# Patient Record
Sex: FEMALE | Race: WHITE | NOT HISPANIC OR LATINO | Employment: FULL TIME | URBAN - METROPOLITAN AREA
[De-identification: names, ages, dates, MRNs, and addresses within clinical notes are randomized per-mention and may not be internally consistent; named-entity substitution may affect disease eponyms.]

---

## 2017-05-22 ENCOUNTER — ALLSCRIPTS OFFICE VISIT (OUTPATIENT)
Dept: OTHER | Facility: OTHER | Age: 47
End: 2017-05-22

## 2017-10-31 ENCOUNTER — ALLSCRIPTS OFFICE VISIT (OUTPATIENT)
Dept: OTHER | Facility: OTHER | Age: 47
End: 2017-10-31

## 2017-10-31 DIAGNOSIS — N92.6 IRREGULAR MENSTRUATION: ICD-10-CM

## 2017-10-31 DIAGNOSIS — Z12.31 ENCOUNTER FOR SCREENING MAMMOGRAM FOR MALIGNANT NEOPLASM OF BREAST: ICD-10-CM

## 2017-11-04 LAB
HPV 18 (HISTORICAL): NOT DETECTED
HPV HIGH RISK 16/18 (HISTORICAL): NOT DETECTED
HPV16 (HISTORICAL): NOT DETECTED
PAP (HISTORICAL): NORMAL

## 2017-11-04 NOTE — PROGRESS NOTES
Assessment  1  Encounter for gynecological examination without abnormal finding (V72 31) (Z01 419)    Discussion/Summary  health maintenance visit healthy adult female Currently, she eats a healthy diet  the risks and benefits of cervical cancer screening were discussed cervical cancer screening is current Pap test was done today HPV and Pap Co-testing Done Today Breast cancer screening: the risks and benefits of breast cancer screening were discussed, self breast exam technique was taught, monthly self breast exam was advised and mammogram has been ordered  Colorectal cancer screening: colorectal cancer screening is not indicated  Osteoporosis screening: bone mineral density testing is not indicated  Advice and education were given regarding nutrition, calcium supplements and vitamin D supplements  Patient discussion: discussed with the patient  Secondary to sudden change in menses will check TFTs and CBC to further evaluate  Reassured patient in perimenopausal times menstrual cycles can start to be irregular before they go away  to track cycles  Return to office for annual as as needed  The patient has the current Goals: Continue to track cycles have blood work done to further evaluate  Reassured likely perimenopause  The patent has the current Barriers: Irregular menstrual cycles  Patient is able to Self-Care  The treatment plan was reviewed with the patient/guardian  The patient/guardian understands and agrees with the treatment plan      History of Present Illness  HPI: 50 yo seen for annual exam  Patient reports over the past 6 months menstrual cycles have been more irregular sometimes occurring 2 weeks early occasionally 2 weeks late  Reports typically has 1 heavy day and then the rest the 5 7 days is tolerable  Last pap: 2016 NILM, 2/2015 NILM (-)HRHPV  Last mammogram 3/3/16 [facility] 1 Negative  Denies bowel or bladder issues  GYN HM, Adult Female [facility]:  The patient is being seen for a health maintenance and gynecology evaluation  Social History: She is   Work status: occupation: [employer]  The patient has never smoked cigarettes  She reports never drinking alcohol  She has never used illicit drugs  General Health: The patient's health since the last visit is described as good  Immunizations status: not up to date  Lifestyle:  She does not use tobacco -- She denies alcohol use  Reproductive health: the patient is premenopausal--   she reports menstrual problems  Menstrual history:  age at menarche was 14  LMP: the last menstrual period was 2/26/2016  Recent menstrual periods: bleeding has been normal  The cycles are irregular-- and-- occur approximately every 14-40 days  The duration of her recent periods has been regular-- and-- usually last 4-7 days  -- she is sexually active  She is monogamous with a male partner-- and-- declines STD testing, denies abuse  Screening:      Review of Systems  menorrhagia, but-- no pelvic pain,-- no pelvic pressure,-- no vaginal pain,-- no vaginal discharge,-- no vaginal itching,-- no vaginal lump or mass,-- no vaginal odor,-- no vulvar pain,-- no vulvar itching,-- no vulvar lump or mass,-- no labial swelling,-- no dysmenorrhea,-- denied amenorrhea,-- no hypomenorrhea,-- no oligomenorrhea,-- no decreased time between periods,-- periods are regular,-- regular length of periods,-- no dysuria,-- no bladder pain,-- no hematuria,-- no burning sensation during urination,-- no change in urinary frequency,-- no feelings of urinary urgency,-- no flank pain,-- no abnormal urethral discharge,-- urine is not foul-smelling,-- no urinary hesitancy-- and-- no urinary loss of control  Constitutional: No fever, no chills, feels well, no tiredness, no recent weight gain or loss  ENT: no ear ache, no loss of hearing, no nosebleeds or nasal discharge, no sore throat or hoarseness     Cardiovascular: no complaints of slow or fast heart rate, no chest pain, no palpitations, no leg claudication or lower extremity edema  Respiratory: no complaints of shortness of breath, no wheezing, no dyspnea on exertion, no orthopnea or PND  Breasts: no complaints of breast pain, breast lump or nipple discharge  Gastrointestinal: no complaints of abdominal pain, no constipation, no nausea or diarrhea, no vomiting, no bloody stools  Genitourinary: no complaints of dysuria, no incontinence, no pelvic pain, no dysmenorrhea, no vaginal discharge or abnormal vaginal bleeding  Musculoskeletal: no complaints of arthralgia, no myalgia, no joint swelling or stiffness, no limb pain or swelling  Integumentary: no complaints of skin rash or lesion, no itching or dry skin, no skin wounds  Neurological: no complaints of headache, no confusion, no numbness or tingling, no dizziness or fainting  OB History  Pregnancy History (Brief):   Prior pregnancies: : 4  Para: 2 (full-term),-- 2 (abortions)-- and-- 2 (living)   Delivery type: 1 vaginal,-- 1  section   Additional pregnancy history details: 2 miscarriage(s)    x 1:  Breech,  x 1: , SAB x 2: ,  (D&E)      Active Problems  1  Allergic conjunctivitis of both eyes (372 14) (H10 13)   2  Depression with anxiety (300 4) (F41 8)   3  Encounter for gynecological examination without abnormal finding (V72 31) (Z01 419)   4  Insomnia (780 52) (G47 00)   5  Joint pain, knee (719 46) (M25 569)   6  Migraine headache (346 90) (G43 909)   7  Need for influenza vaccination (V04 81) (Z23)   8  Need for prophylactic vaccination and inoculation against influenza (V04 81) (Z23)   9  Patellofemoral dysfunction (719 86) (M25 869)   10   Well adult on routine health check (V70 0) (Z00 00)    Past Medical History   · History of Abnormal blood chemistry (790 6) (R79 9)   · Acute bronchitis (466 0) (J20 9)   · Acute pharyngitis (462) (J02 9)   · History of  Delivery (669 70)   · History of acute conjunctivitis (V12 49) (Z86 69)   · History of chest pain (V13 89) (Z87 898)   · History of fatigue (V13 89) (Z87 898)   · History of hyperlipidemia (V12 29) (Z86 39)   · History of Neck pain (723 1) (M54 2)    Surgical History   · History of Oral Surgery Tooth Extraction   · History of Ovarian Cystectomy   · History of Surgical Treatment Of Spontaneous    · History of Tubal Ligation    Family History  Mother    · Family history of Coronary Artery Disease (V17 49)   · Family history of Hypothyroidism   · Family history of Pure Hypercholesterolemia  Father    · Family history of Cancer   · Family history of Diabetes Mellitus (V18 0)   · Family history of colon cancer (V16 0) (Z80 0)  Maternal Grandmother    · Family history of diabetes mellitus (V18 0) (Z83 3)  Paternal Grandmother    · Family history of diabetes mellitus (V18 0) (Z83 3)    Social History   · Alcohol Use (History)   · OCC   · Never A Smoker   · No drug use   · Social alcohol use (Z78 9)    Current Meds   1  Vitamin D 1000 UNIT Oral Tablet; TAKE 1 TABLET DAILY; Therapy: 32TGF6366 to Recorded    Allergies  1  No Known Drug Allergies  2  No Known Environmental Allergies   3  No Known Food Allergies    Vitals   Recorded: 72QYD0363 26:51QJ   Systolic 462, LUE, Sitting   Diastolic 72, LUE, Sitting   Height 5 ft 1 in   Weight 140 lb    BMI Calculated 26 45   BSA Calculated 1 62     Physical Exam    Constitutional   General appearance: No acute distress, well appearing and well nourished  Neck   Neck: Normal, supple, trachea midline, no masses  Thyroid: Normal, no thyromegaly  Pulmonary   Respiratory effort: No increased work of breathing or signs of respiratory distress  Auscultation of lungs: Clear to auscultation  Cardiovascular   Auscultation of heart: Normal rate and rhythm, normal S1 and S2, no murmurs  Peripheral vascular exam: Normal pulses Throughout      Genitourinary   External genitalia: Normal and no lesions appreciated  Vagina: Normal, no lesions or dryness appreciated  Urethra: Normal     Urethral meatus: Normal     Bladder: Normal, soft, non-tender and no prolapse or masses appreciated  Cervix: Normal, no palpable masses  Uterus: Normal, non-tender, not enlarged, and no palpable masses  Adnexa/parametria: Normal, non-tender and no fullness or masses appreciated  Anus, perineum, and rectum: Normal sphincter tone, no masses, and no prolapse  Chest   Breasts: Normal and no dimpling or skin changes noted  Abdomen   Abdomen: Normal, non-tender, and no organomegaly noted  Liver and spleen: No hepatomegaly or splenomegaly  Examination for hernias: No hernias appreciated  Stool sample for occult blood: Negative  Lymphatic   Palpation of lymph nodes in neck, axillae, groin and/or other locations: No lymphadenopathy or masses noted  Skin   Skin and subcutaneous tissue: Normal skin turgor and no rashes  Palpation of skin and subcutaneous tissue: Normal     Psychiatric   Orientation to person, place, and time: Normal     Mood and affect: Normal        Attending Note  Collaborating Physician Note: Collaborating Note: I supervised the Advanced Practitioner-- and-- I agree with the Advanced Practitioner note   I discussed the case with the Advanced Practitioner and reviewed the AP note      Signatures   Electronically signed by : Ashley Srivastava [facility]; Oct 31 2017  9:15PM EST                       (Author)    Electronically signed by : BLANCA Valera ; Nov  3 2017  2:54PM EST                       (Author)

## 2018-01-09 NOTE — PROGRESS NOTES
Assessment    1  Well adult on routine health check (V70 0) (Z00 00)   2  Need for prophylactic vaccination and inoculation against influenza (V04 81) (Z23)    Plan  Depression with anxiety, Hyperlipidemia    · (Q) COMPREHENSIVE METABOLIC PNL W/ADJUSTED CALCIUM; Status:Active; Requested for:18Jan2016;    · (Q) LIPID PANEL WITH REFLEX TO DIRECT LDL; Status:Active; Requested  for:18Jan2016;    · (Q) TSH, 3RD GENERATION W/REFLEX TO FT4; Status:Active; Requested  for:18Jan2016;   Need for prophylactic vaccination and inoculation against influenza    · Fluzone Quadrivalent 0 5 ML Intramuscular Suspension  Well adult on routine health check    · Follow-up visit in 1 year Evaluation and Treatment  Follow-up  Status: Hold For -  Scheduling  Requested for: 59FIM7081   · Brush your teeth 3 times a day and floss at least once a day ; Status:Complete;   Done:  98XHD0821   · Eat a low fat and low cholesterol diet ; Status:Complete;   Done: 44TTH1327   · Use a sun block product with an SPF of 15 or more ; Status:Complete;   Done:  98EEJ6756   · We recommend routine visits to a dentist ; Status:Complete;   Done: 07TSC4651   · Call [phone] if: You find a new or different kind of lump in your breast ;  Status:Complete;   Done: 92HJP8663   · Call [phone] if: You have any warning signs of skin cancer ; Status:Complete;    Done: 27GEO2161    Discussion/Summary  health maintenance visit Currently, she eats a healthy diet  cervical cancer screening is current Breast cancer screening: mammogram is current  Colorectal cancer screening: colorectal cancer screening is not indicated  Screening lab work includes hemoglobin, glucose, lipid profile, thyroid function testing and 25-hydroxyvitamin D  The immunizations are needed  Advice and education were given regarding aerobic exercise  Patient discussion: discussed with the patient        Chief Complaint  Patient here for annual wellness exam      History of Present Illness  HM, Adult Female: The patient is being seen for a health maintenance evaluation  General Health: The patient's health since the last visit is described as good  She has regular dental visits  She denies vision problems  She denies hearing loss  Lifestyle:  She consumes a diverse and healthy diet  She exercises regularly  She does not use tobacco  She denies alcohol use  She denies drug use  Reproductive health:  she reports normal menses  pregnancy history:  Screening: cancer screening reviewed and updated  Cervical cancer screening includes a pap smear performed last year  Breast cancer screening includes a mammogram performed last year  She hasn't been previously screened for colorectal cancer  Review of Systems    Constitutional: No fever, no chills, feels well, no tiredness, no recent weight gain or weight loss  Eyes: No complaints of eye pain, no red eyes, no eyesight problems, no discharge, no dry eyes, no itching of eyes  ENT: no complaints of earache, no loss of hearing, no nose bleeds, no nasal discharge, no sore throat, no hoarseness  Cardiovascular: No complaints of slow heart rate, no fast heart rate, no chest pain, no palpitations, no leg claudication, no lower extremity edema  Respiratory: No complaints of shortness of breath, no wheezing, no cough, no SOB on exertion, no orthopnea, no PND  Gastrointestinal: No complaints of abdominal pain, no constipation, no nausea or vomiting, no diarrhea, no bloody stools  Genitourinary: No complaints of dysuria, no incontinence, no pelvic pain, no dysmenorrhea, no vaginal discharge or bleeding  Musculoskeletal: No complaints of arthralgias, no myalgias, no joint swelling or stiffness, no limb pain or swelling  Neurological: No complaints of headache, no confusion, no convulsions, no numbness, no dizziness or fainting, no tingling, no limb weakness, no difficulty walking     Psychiatric: Not suicidal, no sleep disturbance, no anxiety or depression, no change in personality, no emotional problems  Endocrine: No complaints of proptosis, no hot flashes, no muscle weakness, no deepening of the voice, no feelings of weakness  Hematologic/Lymphatic: No complaints of swollen glands, no swollen glands in the neck, does not bleed easily, does not bruise easily  Active Problems    1  Arm pain, right (729 5) (M79 601)   2  Depression with anxiety (300 4) (F41 8)   3  Hyperlipidemia (272 4) (E78 5)   4  Insomnia (780 52) (G47 00)   5  Joint pain, knee (719 46) (M25 569)   6  Migraine headache (346 90) (G43 909)   7  Need for prophylactic vaccination and inoculation against influenza (V04 81) (Z23)   8  Patellofemoral dysfunction (719 86) (M25 869)    Past Medical History    · History of Abnormal blood chemistry (790 6) (R79 9)   · Acute pharyngitis (462) (J02 9)   · History of  Delivery (669 70)   · History of acute conjunctivitis (V12 49) (Z86 69)   · History of chest pain (V13 89) (B62 222)   · History of fatigue (V13 89) (Z87 898)   · History of Neck pain (723 1) (M54 2)    Surgical History    · History of Oral Surgery Tooth Extraction   · History of Ovarian Cystectomy   · History of Tubal Ligation    Family History    · Family history of Coronary Artery Disease (V17 49)   · Family history of Hypercholesterolemia   · Family history of Hypothyroidism    · Family history of Cancer   · Family history of Diabetes Mellitus (V18 0)    Social History    · Alcohol Use (History)   · OCC   · Never A Smoker    Current Meds   1  Naproxen 500 MG Oral Tablet; TAKE 1 TABLET TWICE DAILY AS NEEDED FOR PAIN   -TAKE WITH FOOD; Therapy: 47YMF1049 to (Complete:2016)  Requested for: 35Meh2003; Last   Rx:70Yah2026 Ordered   2  Temazepam 15 MG Oral Capsule; TAKE 1 CAPSULE AT BEDTIME AS NEEDED FOR   SLEEP; Therapy: 89QJR6420 to (Evaluate:2016); Last Rx:[id number] Ordered   3  Vitamin D 1000 UNIT Oral Tablet; TAKE 1 TABLET DAILY;    Therapy: 78BJB7499 to Recorded    Allergies    1  No Known Drug Allergies    2  No Known Environmental Allergies   3  No Known Food Allergies    Vitals   Recorded: 00XNR1956 02:26PM   Heart Rate 80   Respiration 18   Systolic 215   Diastolic 70   Height 5 ft 1 30 in   Weight 136 lb 4 oz   BMI Calculated 25 49   BSA Calculated 1 61     Physical Exam    Constitutional   General appearance: No acute distress, well appearing and well nourished  Head and Face   Head and face: Normal     Eyes   Conjunctiva and lids: No swelling, erythema or discharge  Pupils and irises: Equal, round, reactive to light  Ears, Nose, Mouth, and Throat   External inspection of ears and nose: Normal     Otoscopic examination: Tympanic membranes translucent with normal light reflex  Canals patent without erythema  Hearing: Normal     Nasal mucosa, septum, and turbinates: Normal without edema or erythema  Lips, teeth, and gums: Normal, good dentition  Oropharynx: Normal with no erythema, edema, exudate or lesions  Neck   Neck: Supple, symmetric, trachea midline, no masses  Thyroid: Normal, no thyromegaly  Pulmonary   Respiratory effort: No increased work of breathing or signs of respiratory distress  Auscultation of lungs: Clear to auscultation  Cardiovascular   Auscultation of heart: Normal rate and rhythm, normal S1 and S2, no murmurs  Examination of extremities for edema and/or varicosities: Normal     Abdomen   Abdomen: Non-tender, no masses  Liver and spleen: No hepatomegaly or splenomegaly  Lymphatic   Palpation of lymph nodes in neck: No lymphadenopathy  Palpation of lymph nodes in axillae: No lymphadenopathy  Musculoskeletal   Gait and station: Normal     Digits and nails: Normal without clubbing or cyanosis  Joints, bones, and muscles: Normal     Range of motion: Normal     Stability: Normal     Muscle strength/tone: Normal     Skin   Skin and subcutaneous tissue: Normal without rashes or lesions  Palpation of skin and subcutaneous tissue: Normal turgor  Neurologic   Cranial nerves: Cranial nerves II-XII intact  Cortical function: Normal mental status  Reflexes: 2+ and symmetric  Sensation: No sensory loss  Coordination: Normal finger to nose and heel to shin  Psychiatric   Judgment and insight: Normal     Orientation to person, place, and time: Normal     Recent and remote memory: Intact      Mood and affect: Normal        Future Appointments    Date/Time Provider Specialty Site   01/19/2017 03:30 PM Yakelin Gonzalez DO Family Medicine On license of [facility] [address]   Electronically signed by : Armando Estrada DO; Jan 18 2016  3:22PM EST                       (Author)

## 2018-01-11 ENCOUNTER — ALLSCRIPTS OFFICE VISIT (OUTPATIENT)
Dept: OTHER | Facility: OTHER | Age: 48
End: 2018-01-11

## 2018-01-11 NOTE — RESULT NOTES
Verified Results  (Q) COMPREHENSIVE METABOLIC PNL W/ADJUSTED CALCIUM [id number] 07:46AM aPm Lares     Test Name Result Flag Reference   GLUCOSE 84 mg/dL  65-99   Fasting reference interval   UREA NITROGEN (BUN) 17 mg/dL  7-25   CREATININE 0 92 mg/dL  0 50-1 10   eGFR NON-AFR   AMERICAN 74 mL/min/1 73m2  > OR = 60   eGFR AFRICAN AMERICAN 86 mL/min/1 73m2  > OR = 60   BUN/CREATININE RATIO   4-84   NOT APPLICABLE (calc)   SODIUM 137 mmol/L  135-146   POTASSIUM 4 3 mmol/L  3 5-5 3   CHLORIDE 104 mmol/L     CARBON DIOXIDE 25 mmol/L  20-31   CALCIUM 8 6 mg/dL  8 6-10 2   CALCIUM (ADJUSTED FOR$ALBUMIN) 8 8 mg/dL (calc)  8 6-10 2   PROTEIN, TOTAL 6 6 g/dL  6 1-8 1   ALBUMIN 4 1 g/dL  3 6-5 1   GLOBULIN 2 5 g/dL (calc)  1 9-3 7   ALBUMIN/GLOBULIN RATIO 1 6 (calc)  1 0-2 5   BILIRUBIN, TOTAL 0 5 mg/dL  0 2-1 2   ALKALINE PHOSPHATASE 45 U/L     AST 20 U/L  10-35   ALT 27 U/L  6-29     (Q) CBC (H/H, RBC, INDICES, WBC, PLT) [id number] 07:46AM Pam Lares     Test Name Result Flag Reference   WHITE BLOOD CELL COUNT 5 1 Thousand/uL  3 8-10 8   RED BLOOD CELL COUNT 4 26 Million/uL  3 80-5 10   HEMOGLOBIN 12 1 g/dL  11 7-15 5   HEMATOCRIT 36 8 %  35 0-45 0   MCV 86 3 fL  80 0-100 0   MCH 28 3 pg  27 0-33 0   MCHC 32 8 g/dL  32 0-36 0   RDW 13 1 %  11 0-15 0   PLATELET COUNT 582 Thousand/uL  140-400   MPV 8 3 fL  7 5-11 5     (Q) VITAMIN B12/FOLATE, SERUM PANEL [id number] 07:46AM Pam Lares     Test Name Result Flag Reference   VITAMIN B12 623 pg/mL  200-1100   FOLATE, SERUM 15 5 ng/mL     Reference Range                             Low:           <3 4                             Borderline:    3 4-5 4                             Normal:        >5 4     (Q) TSH, 3RD GENERATION W/REFLEX TO FT4 [id number] 07:46AM Pam Lares   REPORT COMMENT:  FASTING:YES     Test Name Result Flag Reference   TSH W/REFLEX TO FT4 2 10 mIU/L     Reference Range                         > or = 20 Years  0 40-4 50 Pregnancy Ranges            First trimester    0 26-2 66            Second trimester   0 55-2 73            Third trimester    0 43-2 91       Discussion/Summary   blood level came back normal   No anemia   Normal thyroid level   fasting blood sugar was within the normal range   overall looks very good!   - Dr Mariah Gutierrez   Electronically signed by : Shala Jurado MD; Dec 14 2016 12:25PM EST                       (Author)

## 2018-01-12 VITALS
DIASTOLIC BLOOD PRESSURE: 72 MMHG | SYSTOLIC BLOOD PRESSURE: 108 MMHG | BODY MASS INDEX: 26.43 KG/M2 | WEIGHT: 140 LBS | HEIGHT: 61 IN

## 2018-01-12 VITALS
RESPIRATION RATE: 16 BRPM | BODY MASS INDEX: 25.91 KG/M2 | WEIGHT: 138.5 LBS | HEART RATE: 76 BPM | DIASTOLIC BLOOD PRESSURE: 74 MMHG | SYSTOLIC BLOOD PRESSURE: 102 MMHG

## 2018-01-12 NOTE — CONSULTS
Future Appointments    Signatures   Electronically signed by : Annmarie George DO;  Apr 8 2016 12:05PM EST                       (Author)

## 2018-01-12 NOTE — PROGRESS NOTES
Assessment   1  Headache, unspecified headache type (784 0) (R51)   2  Need for prophylactic vaccination and inoculation against influenza (V04 81) (Z23)   3  Mild hyperlipidemia (272 4) (E78 5)   4  Vitamin D deficiency (268 9) (E55 9)   5  Headache, migraine (346 90) (G43 909)    Plan   Headache, unspecified headache type, Mild hyperlipidemia, Vitamin D deficiency    · (Q) CBC (INCLUDES DIFF/PLT) (REFL); Status:Active; Requested ZGC:[id number];    · (Q) COMPREHENSIVE METABOLIC PNL W/ADJUSTED CALCIUM; Status:Active; Requested IAT:11CAQ9061;    · (Q) LIPID PANEL WITH REFLEX TO DIRECT LDL; Status:Active; Requested    TJO:[id number];    · (Q) TSH, 3RD GENERATION W/REFLEX TO FT4; Status:Active; Requested    JWU:[id number];    · *(Q) VITAMIN D, 25-HYDROXY, LC/MS/MS; Status:Active; Requested CLY:[id number];   Need for prophylactic vaccination and inoculation against influenza    · Fluzone Quadrivalent 0 5 ML Intramuscular Suspension Prefilled Syringe  Screening for depression    · *VB - PHQ-9 Tool; Status:Complete - Retrospective By Protocol Authorization;   Done:    [id number] 01:13PM    Discussion/Summary      Will call if worsens  or will call if stress decreased and head pain is still there, or will call if completely resolves  Chief Complaint   Patient here with weird pain in head not a headache no vision issues      History of Present Illness   HPI: 2 weeks ago with strange pulse in head, occ tender like something squeezing, makes her feel like she will fall over and then goes away nausea, no visual changes in job, son enlisted in [employer], possibly stress lasts a few seconds, become worse at work, with stress was fine    Headache (Brief): The patient is being seen for an initial evaluation of headaches  The patient is currently asymptomatic  No associated symptoms are reported  (2-3 sec of pulse pain and then relief)    Hyperlipidemia (Follow-Up):  The patient states her hyperlipidemia has been stable since the last visit  She has no significant interval events  Symptoms: The patient is currently asymptomatic  Medications: the patient is adherent with her medication regimen  Vitamin D Deficiency: The patient is being seen for follow-up of vitamin D deficiency  The patient is not currently being treated for this problem  The patient is currently asymptomatic  Headache, Migraine (Brief): The patient is being seen for a routine clinic follow-up of migraine headache  The patient is currently asymptomatic  No associated symptoms are reported  The patient is not currently being treated for this problem  Review of Systems        Constitutional: No fever, no chills, feels well, no tiredness, no recent weight gain or loss  ENT: no ear ache, no loss of hearing, no nosebleeds or nasal discharge, no sore throat or hoarseness  Cardiovascular: no complaints of slow or fast heart rate, no chest pain, no palpitations, no leg claudication or lower extremity edema  Respiratory: no complaints of shortness of breath, no wheezing, no dyspnea on exertion, no orthopnea or PND  Breasts: no complaints of breast pain, breast lump or nipple discharge  Gastrointestinal: no complaints of abdominal pain, no constipation, no nausea or diarrhea, no vomiting, no bloody stools  Genitourinary: no complaints of dysuria, no incontinence, no pelvic pain, no dysmenorrhea, no vaginal discharge or abnormal vaginal bleeding  Musculoskeletal: no complaints of arthralgia, no myalgia, no joint swelling or stiffness, no limb pain or swelling  Integumentary: no complaints of skin rash or lesion, no itching or dry skin, no skin wounds  Neurological: headache, but-- as noted in HPI  Over the past 2 weeks, how often have you been bothered by the following problems? 1 ) Little interest or pleasure in doing things? Not at all       2 ) Feeling down, depressed or hopeless?  Not at all       3 ) Trouble falling asleep or sleeping too much? Half the days or more  4 ) Feeling tired or having little energy? Not at all       5 ) Poor appetite or overeating? Not at all       6 ) Feeling bad about yourself, or that you are a failure, or have let yourself or your family down? Not at all       7 ) Trouble concentrating on things, such as reading a newspaper or watching television? Not at all       8 ) Moving or speaking so slowly that other people could have noticed, or the opposite, moving or speaking faster than usual? Not at all       9 ) Thoughts that you would be better off dead or of hurting yourself in some way? Not at all  Active Problems   1  Encounter for gynecological examination without abnormal finding (V72 31) (Z01 419)   2  Headache, migraine (346 90) (G43 909)   3  Insomnia (780 52) (G47 00)   4  Irregular menses (626 4) (N92 6)   5  Need for prophylactic vaccination and inoculation against influenza (V04 81) (Z23)   6  Patellofemoral dysfunction (719 86) (M25 869)   7  Visit for screening mammogram (V76 12) (Z12 31)   8  Well adult on routine health check (V70 0) (Z00 00)    Past Medical History   1  History of Abnormal blood chemistry (790 6) (R79 9)   2  Acute bronchitis (466 0) (J20 9)   3  Acute pharyngitis (462) (J02 9)   4  History of  Delivery (669 70)   5  History of acute conjunctivitis (V12 49) (Z86 69)   6  History of chest pain (V13 89) (Z87 898)   7  History of fatigue (V13 89) (Z87 898)   8  History of hyperlipidemia (V12 29) (Z86 39)   9  History of Neck pain (723 1) (M54 2)  Active Problems And Past Medical History Reviewed: The active problems and past medical history were reviewed and updated today  Family History   Mother    1  Family history of Coronary Artery Disease (V17 49)   2  Family history of Hypothyroidism   3  Family history of Pure Hypercholesterolemia  Father    4  Family history of Cancer   5  Family history of Diabetes Mellitus (V18 0)   6   Family history of colon cancer (V16 0) (Z80 0)  Maternal Grandmother    7  Family history of diabetes mellitus (V18 0) (Z83 3)  Paternal Grandmother    6  Family history of diabetes mellitus (V18 0) (Z83 3)  Family History Reviewed: The family history was reviewed and updated today  Social History    · Alcohol Use (History)   · OCC   · Never A Smoker   · No drug use   · Social alcohol use (Z78 9)  The social history was reviewed and updated today  Surgical History   1  History of Oral Surgery Tooth Extraction   2  History of Ovarian Cystectomy   3  History of Surgical Treatment Of Spontaneous    4  History of Tubal Ligation  Surgical History Reviewed: The surgical history was reviewed and updated today  Current Meds    1  Vitamin D 1000 UNIT Oral Tablet; TAKE 1 TABLET DAILY; Therapy: [id number] to Recorded     The medication list was reviewed and updated today  Allergies   1  No Known Drug Allergies  2  No Known Environmental Allergies   3  No Known Food Allergies    Vitals    Recorded: 95DEB3405 12:32PM   Heart Rate 88   Respiration 16   Systolic 841   Diastolic 70   Height 5 ft 1 in   Weight 139 lb 4 oz   BMI Calculated 26 31   BSA Calculated 1 62     Physical Exam        Constitutional      General appearance: No acute distress, well appearing and well nourished  Eyes      Conjunctiva and lids: No swelling, erythema or discharge  Ears, Nose, Mouth, and Throat      External inspection of ears and nose: Normal        Otoscopic examination: Tympanic membranes translucent with normal light reflex  Canals patent without erythema  Nasal mucosa, septum, and turbinates: Normal without edema or erythema  Oropharynx: Normal with no erythema, edema, exudate or lesions  Pulmonary      Respiratory effort: No increased work of breathing or signs of respiratory distress  Auscultation of lungs: Clear to auscultation         Cardiovascular      Auscultation of heart: Normal rate and rhythm, normal S1 and S2, without murmurs  Examination of extremities for edema and/or varicosities: Normal        Abdomen      Abdomen: Non-tender, no masses  Liver and spleen: No hepatomegaly or splenomegaly  Lymphatic      Palpation of lymph nodes in neck: No lymphadenopathy  Musculoskeletal      Gait and station: Normal        Skin      Skin and subcutaneous tissue: Normal without rashes or lesions  Neurologic      Cranial nerves: Cranial nerves 2-12 intact  Reflexes: 2+ and symmetric  Sensation: No sensory loss         Psychiatric      Orientation to person, place, and time: Normal        Mood and affect: Normal           Results/Data   *VB - PHQ-9 Tool [id number] 01:13PM Malia Suarez      Test Name Result Flag Reference   PHQ-9 Adult Depression Score 2     PHQ-9 Adult Depression Screening Negative          Signatures    Electronically signed by : Sue Richey DO; Jan 11 2018  2:26PM EST                       (Author)

## 2018-01-13 LAB
25(OH)D3 SERPL-MCNC: 29 NG/ML (ref 30–100)
A/G RATIO (HISTORICAL): 1.5 (CALC) (ref 1–2.5)
ALBUMIN SERPL BCP-MCNC: 4.3 G/DL (ref 3.6–5.1)
ALP SERPL-CCNC: 49 U/L (ref 33–115)
ALT SERPL W P-5'-P-CCNC: 22 U/L (ref 6–29)
AST SERPL W P-5'-P-CCNC: 22 U/L (ref 10–35)
BASOPHILS # BLD AUTO: 0.5 %
BASOPHILS # BLD AUTO: 31 CELLS/UL (ref 0–200)
BILIRUB SERPL-MCNC: 0.5 MG/DL (ref 0.2–1.2)
BUN SERPL-MCNC: 19 MG/DL (ref 7–25)
BUN/CREA RATIO (HISTORICAL): NORMAL (CALC) (ref 6–22)
CALCIUM (ADJUSTED FOR ALBUMIN) (HISTORICAL): 9.4 MG/DL (CALC) (ref 8.6–10.2)
CALCIUM SERPL-MCNC: 9.3 MG/DL (ref 8.6–10.2)
CHLORIDE SERPL-SCNC: 104 MMOL/L (ref 98–110)
CHOLEST SERPL-MCNC: 266 MG/DL
CHOLEST/HDLC SERPL: 3.5 (CALC)
CO2 SERPL-SCNC: 26 MMOL/L (ref 20–31)
CREAT SERPL-MCNC: 0.89 MG/DL (ref 0.5–1.1)
DEPRECATED RDW RBC AUTO: 12.2 % (ref 11–15)
EGFR AFRICAN AMERICAN (HISTORICAL): 89 ML/MIN/1.73M2
EGFR-AMERICAN CALC (HISTORICAL): 77 ML/MIN/1.73M2
EOSINOPHIL # BLD AUTO: 0.8 %
EOSINOPHIL # BLD AUTO: 49 CELLS/UL (ref 15–500)
GAMMA GLOBULIN (HISTORICAL): 2.8 G/DL (CALC) (ref 1.9–3.7)
GLUCOSE (HISTORICAL): 95 MG/DL (ref 65–99)
HCT VFR BLD AUTO: 37.9 % (ref 35–45)
HDLC SERPL-MCNC: 76 MG/DL
HGB BLD-MCNC: 12.9 G/DL (ref 11.7–15.5)
LDL CHOLESTEROL (HISTORICAL): 164 MG/DL (CALC)
LYMPHOCYTES # BLD AUTO: 1202 CELLS/UL (ref 850–3900)
LYMPHOCYTES # BLD AUTO: 19.7 %
MCH RBC QN AUTO: 29.4 PG (ref 27–33)
MCHC RBC AUTO-ENTMCNC: 34 G/DL (ref 32–36)
MCV RBC AUTO: 86.3 FL (ref 80–100)
MONOCYTES # BLD AUTO: 403 CELLS/UL (ref 200–950)
MONOCYTES (HISTORICAL): 6.6 %
NEUTROPHILS # BLD AUTO: 4416 CELLS/UL (ref 1500–7800)
NEUTROPHILS # BLD AUTO: 72.4 %
NON-HDL-CHOL (CHOL-HDL) (HISTORICAL): 190 MG/DL (CALC)
PLATELET # BLD AUTO: 240 THOUSAND/UL (ref 140–400)
PMV BLD AUTO: 10 FL (ref 7.5–12.5)
POTASSIUM SERPL-SCNC: 4.4 MMOL/L (ref 3.5–5.3)
RBC # BLD AUTO: 4.39 MILLION/UL (ref 3.8–5.1)
SODIUM SERPL-SCNC: 138 MMOL/L (ref 135–146)
TOTAL PROTEIN (HISTORICAL): 7.1 G/DL (ref 6.1–8.1)
TRIGL SERPL-MCNC: 129 MG/DL
TSH SERPL DL<=0.05 MIU/L-ACNC: 1.16 MIU/L
WBC # BLD AUTO: 6.1 THOUSAND/UL (ref 3.8–10.8)

## 2018-01-14 ENCOUNTER — GENERIC CONVERSION - ENCOUNTER (OUTPATIENT)
Dept: OTHER | Facility: OTHER | Age: 48
End: 2018-01-14

## 2018-01-16 NOTE — PROGRESS NOTES
Assessment    1  Acute bronchitis (466 0) (J20 9)    Plan  Acute bronchitis    · Azithromycin 250 MG Oral Tablet; TAKE 2 TABLETS ON DAY 1 THEN TAKE 1  TABLET A DAY FOR 4 DAYS   · Promethazine-Codeine 6 25-10 MG/5ML Oral Syrup; take one teaspoon every 6  hours as needed for cough   · Follow Up if Not Better Evaluation and Treatment  Follow-up  Status: Complete  Done:  00GBF8727    Discussion/Summary    Pt is stable on exam  A note was provided for work  Will treat with Azithromycin x 5 days, Phenergan/Codeine Syrup for when the cough is bad (OTC Cough and Cold Preps otherwise PRN), good PO hydration, and rest  She is to f/u PRN  The patient was counseled regarding instructions for management, impressions, importance of compliance with treatment  Possible side effects of new medications were reviewed with the patient/guardian today  The treatment plan was reviewed with the patient/guardian  The patient/guardian understands and agrees with the treatment plan      Chief Complaint  PT is here today due to having a dry cough  X 5 days      History of Present Illness  HPI: Pt presents today x 1 week  Mild runny / stuffy nose  No fevers known  All: NKDA  Non-smoker  Pt is not pregnant  Review of Systems    Constitutional: as noted in HPI    ENT: as noted in HPI  Respiratory: as noted in HPI  Active Problems    1  Arm pain, right (729 5) (M79 601)   2  Depression with anxiety (300 4) (F41 8)   3  Hyperlipidemia (272 4) (E78 5)   4  Insomnia (780 52) (G47 00)   5  Joint pain, knee (719 46) (M25 569)   6  Migraine headache (346 90) (G43 909)   7  Need for prophylactic vaccination and inoculation against influenza (V04 81) (Z23)   8  Patellofemoral dysfunction (719 86) (M25 869)   9  Well adult on routine health check (V70 0) (Z00 00)    Past Medical History    1  History of Abnormal blood chemistry (790 6) (R79 9)   2  Acute pharyngitis (462) (J02 9)   3  History of  Delivery (669 70)   4   History of acute conjunctivitis (V12 49) (Z86 69)   5  History of chest pain (V13 89) (Z87 898)   6  History of fatigue (V13 89) (Z87 898)   7  History of Neck pain (723 1) (M54 2)  Active Problems And Past Medical History Reviewed: The active problems and past medical history were reviewed and updated today  Family History    1  Family history of Coronary Artery Disease (V17 49)   2  Family history of Hypercholesterolemia   3  Family history of Hypothyroidism    4  Family history of Cancer   5  Family history of Diabetes Mellitus (V18 0)    Social History    · Alcohol Use (History)   · OCC   · Never A Smoker    Surgical History    1  History of Oral Surgery Tooth Extraction   2  History of Ovarian Cystectomy   3  History of Tubal Ligation    Current Meds   1  Temazepam 15 MG Oral Capsule; TAKE 1 CAPSULE AT BEDTIME AS NEEDED FOR   SLEEP; Therapy: 59YVJ9701 to (Evaluate:16Jde2552); Last Rx:[id number] Ordered   2  Vitamin D 1000 UNIT Oral Tablet; TAKE 1 TABLET DAILY; Therapy: 08UCL1291 to Recorded    The medication list was reviewed and updated today  Allergies    1  No Known Drug Allergies    2  No Known Environmental Allergies   3  No Known Food Allergies    Vitals   Recorded: 21CIQ5929 12:51PM   Temperature 99 F   Heart Rate 72   Respiration 16   Systolic 099   Diastolic 72   Height 5 ft 1 3 in   Weight 136 lb    BMI Calculated 25 45   BSA Calculated 1 61     Physical Exam    Constitutional   General appearance: No acute distress, well appearing and well nourished  NAD  Eyes   Conjunctiva and lids: No swelling, erythema or discharge  Ears, Nose, Mouth, and Throat   External inspection of ears and nose: Normal     Otoscopic examination: Tympanic membranes translucent with normal light reflex  Canals patent without erythema  Nasal mucosa, septum, and turbinates: Abnormal   NM boggy and edematous  Oropharynx: Normal with no erythema, edema, exudate or lesions      Pulmonary   Respiratory effort: No increased work of breathing or signs of respiratory distress  Auscultation of lungs: Clear to auscultation  Cardiovascular   Auscultation of heart: Normal rate and rhythm, normal S1 and S2, without murmurs  Lymphatic   Palpation of lymph nodes in neck: No lymphadenopathy      Musculoskeletal   Gait and station: Normal     Psychiatric   Orientation to person, place, and time: Normal     Mood and affect: Normal          Future Appointments    Date/Time Provider Specialty Site   01/19/2017 03:30 PM Susan Arias, [facility] [place] [place]   Electronically signed by : Calvin Oliveira DO; Jan 29 2016  1:53PM EST                       (Author)

## 2018-01-22 VITALS
WEIGHT: 139.25 LBS | HEIGHT: 61 IN | SYSTOLIC BLOOD PRESSURE: 110 MMHG | RESPIRATION RATE: 16 BRPM | DIASTOLIC BLOOD PRESSURE: 70 MMHG | BODY MASS INDEX: 26.29 KG/M2 | HEART RATE: 88 BPM

## 2018-01-23 NOTE — PROGRESS NOTES
Assessment    1  Right shoulder pain (719 41) (M28 511)    Plan  Right shoulder pain    · *1 - [facility]  Evaluate and treat  Continue current  treatment for right shoulder  2-3 times per week for 4 weeks  Status: Hold For - Scheduling  Requested for:  08Apr2016  Care Summary provided  : Yes    Discussion/Summary    Suspected right rotator cuff tear with referred pain to deltoid  1  New script for PT ordered- continue treatment  2  Cortisone injections prn q 3 months  3  F/u PRN  Chief Complaint    1  Shoulder Pain    History of Present Illness  Patient presents for 6 week f/u of right shoulder pain  Notes great improvement with PT and te cortisone injection  Her ROM, strength, and pain has greatly improved  She wishes to continue PT and treat this conservatively  Review of Systems    Constitutional: No fever, no chills, feels well, no tiredness, no recent weight gain or loss  Eyes: No complaints of eyesight problems, no red eyes  ENT: no loss of hearing, no nosebleeds, no sore throat  Cardiovascular: No complaints of chest pain, no palpitations, no leg claudication or lower extremity edema  Respiratory: no compliants of shortness of breath, no wheezing, no cough  Gastrointestinal: no complaints of abdominal pain, no constipation, no nausea or diarrhea, no vomiting, no bloody stools  Genitourinary: no complaints of dysuria, no incontinence  Musculoskeletal: as noted in HPI  Integumentary: no complaints of skin rash or lesion, no itching or dry skin, no skin wounds  Neurological: no complaints of headache, no confusion, no numbness or tingling, no dizziness  Endocrine: No complaints of muscle weakness, no feelings of weakness, no frequent urination, no excessive thirst    Psychiatric: No suicidal thoughts, no anxiety, no feelings of depression  Active Problems    1  Arm pain, right (729 5) (T29 331)   2   Depression with anxiety (300 4) (F41 8) 3  Encounter for gynecological examination without abnormal finding (V72 31) (Z01 419)   4  Insomnia (780 52) (G47 00)   5  Joint pain, knee (719 46) (M25 569)   6  Migraine headache (346 90) (G43 909)   7  Need for prophylactic vaccination and inoculation against influenza (V04 81) (Z23)   8  Patellofemoral dysfunction (719 86) (M25 869)   9  Right shoulder pain (719 41) (M25 511)   10  Well adult on routine health check (V70 0) (Z00 00)    Past Medical History    · History of Abnormal blood chemistry (790 6) (R79 9)   · Acute bronchitis (466 0) (J20 9)   · Acute pharyngitis (462) (J02 9)   · History of  Delivery (669 70)   · History of acute conjunctivitis (V12 49) (Z86 69)   · History of chest pain (V13 89) (K27 467)   · History of fatigue (V13 89) (H33 628)   · History of hyperlipidemia (V12 29) (Z86 39)   · History of Neck pain (723 1) (M54 2)    The active problems and past medical history were reviewed and updated today  Surgical History    · History of Oral Surgery Tooth Extraction   · History of Ovarian Cystectomy   · History of Surgical Treatment Of Spontaneous    · History of Tubal Ligation    The surgical history was reviewed and updated today  Family History    · Family history of Coronary Artery Disease (V17 49)   · Family history of Hypercholesterolemia   · Family history of Hypothyroidism    · Family history of Cancer   · Family history of Diabetes Mellitus (V18 0)   · Family history of colon cancer (V16 0) (Z80 0)    · Family history of diabetes mellitus (V18 0) (Z83 3)    · Family history of diabetes mellitus (V18 0) (Z83 3)    The family history was reviewed and updated today  Social History    · Alcohol Use (History)   · OCC   · Never A Smoker   · No drug use   · Social alcohol use (Z78 9)  The social history was reviewed and updated today  The social history was reviewed and is unchanged  Current Meds   1   Temazepam 15 MG Oral Capsule; TAKE 1 CAPSULE AT BEDTIME AS NEEDED FOR   SLEEP; Therapy: 71CFV2284 to (Evaluate:21Apr2016); Last Rx:[id number] Ordered   2  Vitamin D 1000 UNIT Oral Tablet; TAKE 1 TABLET DAILY; Therapy: 51HDR6897 to Recorded    The medication list was reviewed and updated today  Allergies    1  No Known Drug Allergies    2  No Known Environmental Allergies   3  No Known Food Allergies    Vitals   Recorded: 08Apr2016 08:22AM   Weight 135 lb 6 oz   BMI Calculated 24 76   BSA Calculated 1 62     Physical Exam    Right Shoulder: Appearance: Normal  Deltoid tenderness  Crepitus of AC joint  Motor: 3/5 abduction and 3/5 internal rotation, but 5/5 forward flexion and 5/5 extension  Forward flexion: painless normal AROM  Extension: painless normal AROM  Abduction: painful restricted AROM 100 degrees  Internal rotation: painful restricted AROM  External rotation: normal AROM  Special Tests: Positive lift off behind back  , but negative Painful Arc, negative Moon test, negative Drop Arm test, negative Empty Can test and negative Camacho's test    Constitutional - General appearance: Normal    Musculoskeletal - Gait and station: Normal  Digits and nails: Normal  Muscle strength/tone: Normal    Cardiovascular - Pulses: Normal  Examination of extremities for edema and/or varicosities: Normal    Skin - Skin and subcutaneous tissue: Normal    Neurologic - Sensation: Normal    Psychiatric - Orientation to person, place, and time: Normal  Mood and affect: Normal    Eyes   Conjunctiva and lids: Normal     Pupils and irises: Normal        Attending Note  Collaborating Physician Note: Collaborating Physician: I interviewed and examined the patient, I discussed the case with the Advanced Practitioner and reviewed the note and I agree with the Advanced Practitioner note        Future Appointments    Date/Time Provider Specialty Site   01/19/2017 03:30 PM Khushboo Hill DO Family Medicine [address] [facility]     Signatures   Electronically signed by : Zenaida Gonzalez DO;  Apr 8 2016 12:05PM EST                       (Author)

## 2018-01-29 ENCOUNTER — OFFICE VISIT (OUTPATIENT)
Dept: FAMILY MEDICINE CLINIC | Facility: CLINIC | Age: 48
End: 2018-01-29
Payer: COMMERCIAL

## 2018-01-29 VITALS
BODY MASS INDEX: 26.02 KG/M2 | DIASTOLIC BLOOD PRESSURE: 80 MMHG | HEIGHT: 62 IN | WEIGHT: 141.4 LBS | SYSTOLIC BLOOD PRESSURE: 112 MMHG | HEART RATE: 72 BPM | RESPIRATION RATE: 16 BRPM

## 2018-01-29 DIAGNOSIS — E55.9 VITAMIN D DEFICIENCY: Chronic | ICD-10-CM

## 2018-01-29 DIAGNOSIS — Z00.00 WELL ADULT EXAM: Primary | ICD-10-CM

## 2018-01-29 DIAGNOSIS — E78.5 MILD HYPERLIPIDEMIA: Chronic | ICD-10-CM

## 2018-01-29 PROBLEM — H10.13 ALLERGIC CONJUNCTIVITIS OF BOTH EYES: Status: RESOLVED | Noted: 2017-05-22 | Resolved: 2018-01-29

## 2018-01-29 PROBLEM — H10.13 ALLERGIC CONJUNCTIVITIS OF BOTH EYES: Status: ACTIVE | Noted: 2017-05-22

## 2018-01-29 PROBLEM — N92.6 IRREGULAR MENSES: Status: ACTIVE | Noted: 2017-10-31

## 2018-01-29 PROCEDURE — 99396 PREV VISIT EST AGE 40-64: CPT | Performed by: FAMILY MEDICINE

## 2018-01-29 NOTE — PATIENT INSTRUCTIONS

## 2018-01-29 NOTE — PROGRESS NOTES
Assessment/Plan:    No problem-specific Assessment & Plan notes found for this encounter  Diagnoses and all orders for this visit:    Well adult exam    Mild hyperlipidemia  -     Comprehensive metabolic panel; Future  -     Lipid Panel with Direct LDL reflex; Future  -     TSH, 3rd generation with T4 reflex; Future  -     Comprehensive metabolic panel  -     Lipid Panel with Direct LDL reflex  -     TSH, 3rd generation with T4 reflex    Vitamin D deficiency  -     Vitamin D 25 hydroxy; Future  -     Vitamin D 25 hydroxy    Other orders  -     cholecalciferol (VITAMIN D3) 1,000 units tablet; Take 1 tablet by mouth daily          Subjective:      Patient ID: Breanna Andres is a 49 y o  female  Mrs Baird Head is here for a well visit  She is feeling well  Her headaches have improved  She is not exercising at this time  Work is stressful  The following portions of the patient's history were reviewed and updated as appropriate: allergies, current medications, past family history, past medical history, past social history, past surgical history and problem list     Review of Systems   Constitutional: Positive for fatigue  HENT: Negative  Eyes: Negative  Respiratory: Negative  Cardiovascular: Negative  Gastrointestinal: Negative  Endocrine: Negative  Genitourinary: Negative  Musculoskeletal: Negative  Allergic/Immunologic: Negative  Neurological: Negative  Hematological: Negative  Psychiatric/Behavioral: Negative  Objective:     Physical Exam   Constitutional: She is oriented to person, place, and time  Vital signs are normal  She appears well-developed and well-nourished  HENT:   Head: Normocephalic and atraumatic  Nose: Nose normal    Mouth/Throat: Oropharynx is clear and moist    Eyes: Conjunctivae, EOM and lids are normal  Pupils are equal, round, and reactive to light  Neck: Normal range of motion  Neck supple     Cardiovascular: Normal rate, regular rhythm, S1 normal, S2 normal, normal heart sounds and intact distal pulses  Pulmonary/Chest: Effort normal and breath sounds normal    Abdominal: Soft  Bowel sounds are normal    Musculoskeletal: Normal range of motion  Neurological: She is alert and oriented to person, place, and time  She has normal strength and normal reflexes  Skin: Skin is warm, dry and intact  Psychiatric: She has a normal mood and affect  Her speech is normal and behavior is normal  Judgment and thought content normal  Cognition and memory are normal    Nursing note and vitals reviewed

## 2018-02-26 NOTE — MISCELLANEOUS
Message  Return to work or school:   Ariel Herrera is under my professional care  She was seen in my office on 01/11/2018   She is able to return to work on  01/12/2018       [facility] DO SONY/PORFIRIO        Signatures   Electronically signed by : Bertha Koroma DO; Jan 11 2018  2:37PM EST                       (Author)

## 2018-02-26 NOTE — RESULT NOTES
Discussion/Summary   CHOLESTEROL IS HIGH  WE NEED TO DISCUSS OPTIONS FOR TREAMTENT      [facility]     Verified Results  (Q) COMPREHENSIVE METABOLIC PNL W/ADJUSTED CALCIUM [id number] 07:56AM Marilin Muniz     Test Name Result Flag Reference   GLUCOSE 95 mg/dL  65-99   Fasting reference interval   UREA NITROGEN (BUN) 19 mg/dL  7-25   CREATININE 0 89 mg/dL  0 50-1 10   eGFR NON-AFR  AMERICAN 77 mL/min/1 73m2  > OR = 60   eGFR AFRICAN AMERICAN 89 mL/min/1 73m2  > OR = 60   BUN/CREATININE RATIO   2-34   NOT APPLICABLE (calc)   SODIUM 138 mmol/L  135-146   POTASSIUM 4 4 mmol/L  3 5-5 3   CHLORIDE 104 mmol/L     CARBON DIOXIDE 26 mmol/L  20-31   CALCIUM 9 3 mg/dL  8 6-10 2   CALCIUM (ADJUSTED FOR$ALBUMIN) 9 4 mg/dL (calc)  8 6-10 2   PROTEIN, TOTAL 7 1 g/dL  6 1-8 1   ALBUMIN 4 3 g/dL  3 6-5 1   GLOBULIN 2 8 g/dL (calc)  1 9-3 7   ALBUMIN/GLOBULIN RATIO 1 5 (calc)  1 0-2 5   BILIRUBIN, TOTAL 0 5 mg/dL  0 2-1 2   ALKALINE PHOSPHATASE 49 U/L     AST 22 U/L  10-35   ALT 22 U/L  6-29     (Q) LIPID PANEL WITH REFLEX TO DIRECT LDL [id number] 07:56AM Marilin Muniz     Test Name Result Flag Reference   CHOLESTEROL, TOTAL 266 mg/dL H <200   HDL CHOLESTEROL 76 mg/dL  >06   TRIGLICERIDES 202 mg/dL  <150   LDL-CHOLESTEROL 164 mg/dL (calc) H    Reference range: <100     Desirable range <100 mg/dL for patients with CHD or  diabetes and <70 mg/dL for diabetic patients with  known heart disease  LDL-C is now calculated using the Frank-Muller   calculation, which is a validated novel method providing   better accuracy than the Friedewald equation in the   estimation of LDL-C  Stephania Blum [place] [address];443(45): [phone]   (http://Endorse.me/faq/SAL469)   CHOL/HDLC RATIO 3 5 (calc)  <5 0   NON HDL CHOLESTEROL 190 mg/dL (calc) H <130   For patients with diabetes plus 1 major ASCVD risk   factor, treating to a non-HDL-C goal of <100 mg/dL   (LDL-C of <70 mg/dL) is considered a therapeutic   option  (Q) TSH, 3RD GENERATION W/REFLEX TO FT4 [id number] 07:56AM [address] [place] [address]   REPORT COMMENT:  FASTING:YES     Test Name Result Flag Reference   TSH W/REFLEX TO FT4 1 16 mIU/L     Reference Range                         > or = 20 Years  0 40-4 50                              Pregnancy Ranges            First trimester    0 26-2 66            Second trimester   0 55-2 73            Third trimester    0 43-2 91     (Q) CBC (INCLUDES DIFF/PLT) (REFL) [id number] 07:56AM Germaine Cole     Test Name Result Flag Reference   WHITE BLOOD CELL COUNT 6 1 Thousand/uL  3 8-10 8   RED BLOOD CELL COUNT 4 39 Million/uL  3 80-5 10   HEMOGLOBIN 12 9 g/dL  11 7-15 5   HEMATOCRIT 37 9 %  35 0-45 0   MCV 86 3 fL  80 0-100 0   MCH 29 4 pg  27 0-33 0   MCHC 34 0 g/dL  32 0-36 0   RDW 12 2 %  11 0-15 0   PLATELET COUNT 211 Thousand/uL  140-400   MPV 10 0 fL  7 5-12 5   ABSOLUTE NEUTROPHILS 4416 cells/uL  3375-7747   ABSOLUTE LYMPHOCYTES 1202 cells/uL  850-3900   ABSOLUTE MONOCYTES 403 cells/uL  200-950   ABSOLUTE EOSINOPHILS 49 cells/uL     ABSOLUTE BASOPHILS 31 cells/uL  0-200   NEUTROPHILS 72 4 %     LYMPHOCYTES 19 7 %     MONOCYTES 6 6 %     EOSINOPHILS 0 8 %     BASOPHILS 0 5 %       *(Q) VITAMIN D, 25-HYDROXY, LC/MS/MS [id number] 07:56AM Germaine Cole     Test Name Result Flag Reference   VITAMIN D, 25-OH, TOTAL 29 ng/mL L    Vitamin D Status         25-OH Vitamin D:     Deficiency:                    <20 ng/mL  Insufficiency:             20 - 29 ng/mL  Optimal:                 > or = 30 ng/mL     For 25-OH Vitamin D testing on patients on   D2-supplementation and patients for whom quantitation   of D2 and D3 fractions is required, the QuestAssureD(TM)  25-OH VIT D, (D2,D3), LC/MS/MS is recommended: order   code 44815 (patients >2yrs)  For more information on this test, go to:  http://ithinksport/faq/XEG641  (This link is being provided for   informational/educational purposes only )

## 2018-07-30 ENCOUNTER — OFFICE VISIT (OUTPATIENT)
Dept: FAMILY MEDICINE CLINIC | Facility: CLINIC | Age: 48
End: 2018-07-30
Payer: COMMERCIAL

## 2018-07-30 VITALS
HEART RATE: 72 BPM | RESPIRATION RATE: 14 BRPM | WEIGHT: 138.2 LBS | SYSTOLIC BLOOD PRESSURE: 100 MMHG | DIASTOLIC BLOOD PRESSURE: 70 MMHG | HEIGHT: 62 IN | BODY MASS INDEX: 25.43 KG/M2

## 2018-07-30 DIAGNOSIS — E78.5 MILD HYPERLIPIDEMIA: Primary | ICD-10-CM

## 2018-07-30 DIAGNOSIS — E55.9 VITAMIN D DEFICIENCY: ICD-10-CM

## 2018-07-30 DIAGNOSIS — Z12.39 SCREENING FOR BREAST CANCER: ICD-10-CM

## 2018-07-30 DIAGNOSIS — G43.709 CHRONIC MIGRAINE WITHOUT AURA WITHOUT STATUS MIGRAINOSUS, NOT INTRACTABLE: ICD-10-CM

## 2018-07-30 PROCEDURE — 99214 OFFICE O/P EST MOD 30 MIN: CPT | Performed by: FAMILY MEDICINE

## 2018-07-30 PROCEDURE — 3008F BODY MASS INDEX DOCD: CPT | Performed by: FAMILY MEDICINE

## 2018-07-30 RX ORDER — CHLORAL HYDRATE 500 MG
1000 CAPSULE ORAL DAILY
COMMUNITY
End: 2021-01-07

## 2018-07-30 NOTE — PROGRESS NOTES
Assessment/Plan:    Problem List Items Addressed This Visit     Headache, migraine     Vision has been the cause  Waiting for new glasses         Mild hyperlipidemia - Primary     Check labs         Relevant Orders    Comprehensive metabolic panel    Lipid Panel with Direct LDL reflex    TSH, 3rd generation with Free T4 reflex    Vitamin D deficiency     Taking vitamin d         Relevant Orders    Vitamin D 25 hydroxy      Other Visit Diagnoses     Screening for breast cancer        Relevant Orders    Mammo screening bilateral w cad          Patient Instructions   Hyperlipidemia   AMBULATORY CARE:   Hyperlipidemia  is a high level of lipids (fats) in your blood  These lipids include cholesterol or triglycerides  Lipids are made by your body  They also come from the foods you eat  Your body needs lipids to work properly, but high levels increase your risk for heart disease, heart attack, and stroke  Call 911 for any of the following:   · You have any of the following signs of a heart attack:      ¨ Squeezing, pressure, or pain in your chest that lasts longer than 5 minutes or returns    ¨ Discomfort or pain in your back, neck, jaw, stomach, or arm     ¨ Trouble breathing    ¨ Nausea or vomiting    ¨ Lightheadedness or a sudden cold sweat, especially with chest pain or trouble breathing    · You have any of the following signs of a stroke:      ¨ Numbness or drooping on one side of your face     ¨ Weakness in an arm or leg    ¨ Confusion or difficulty speaking    ¨ Dizziness, a severe headache, or vision loss  Contact your healthcare provider if:   · You have questions or concerns about your condition or care  Treatment of hyperlipidemia  may first include lifestyle changes to help decrease your lipid levels  You may also need to take medicine to lower your lipid levels  Some of the lifestyle changes you may need to make include the following:  · Maintain a healthy weight    Ask your healthcare provider how much you should weigh  Ask him or her to help you create a weight loss plan if you are overweight  Weight loss can decrease your cholesterol and triglyceride levels  · Exercise as directed  Exercise lowers your cholesterol levels and helps you maintain a healthy weight  Get 40 minutes or more of moderate exercise 3 to 4 days each week  You can split your exercise into four 10-minute workouts instead of 40 minutes at one time  Examples of moderate exercises include walking briskly, swimming, or riding a bike  Work with your healthcare provider to plan the best exercise program for you  · Do not smoke  Nicotine and other chemicals in cigarettes and cigars can increase your risk for a heart attack and stroke  Ask your healthcare provider for information if you currently smoke and need help to quit  E-cigarettes or smokeless tobacco still contain nicotine  Talk to your healthcare provider before you use these products  · Eat heart-healthy foods  Talk to your dietitian about a heart-healthy diet  The following will help you manage hyperlipidemia:     ¨ Decrease the total amount of fat you eat  Choose lean meats, fat-free or 1% fat milk, and low-fat dairy products, such as yogurt and cheese  Limit or do not eat red meat  Red meats are high in fat and cholesterol  ¨ Replace unhealthy fats with healthy fats  Unhealthy fats include saturated fat, trans fat, and cholesterol  Choose soft margarines that are low in saturated fat and have little or no trans fat  Monounsaturated fats are healthy fats  These are found in olive oil, canola oil, avocado, and nuts  Polyunsaturated fats are also healthy  These are found in fish, flaxseed, walnuts, and soybeans  ¨ Eat fruits and vegetables every day  They are low in calories and fat and a good source of essential vitamins  Include dark green, red, and orange vegetables  Examples include spinach, kale, broccoli, and carrots  ¨ Eat foods high in fiber    Choose whole grain, high-fiber foods  Good choices include whole-wheat breads or cereals, beans, peas, fruits, and vegetables  · Ask your healthcare provider if it is safe for you to drink alcohol  Alcohol can increase your cholesterol and triglyceride levels  A drink of alcohol is 12 ounces of beer, 5 ounces of wine, or 1½ ounces of liquor  Follow up with your healthcare provider as directed: You may need to return for more tests  Your healthcare provider may refer you to a dietitian  Write down your questions so you remember to ask them during your visits  © 2017 [employer]  Information is for End User's use only and may not be sold, redistributed or otherwise used for commercial purposes  All illustrations and images included in CareNotes® are the copyrighted property of A D A M , Inc  or [employer]  The above information is an  only  It is not intended as medical advice for individual conditions or treatments  Talk to your doctor, nurse or pharmacist before following any medical regimen to see if it is safe and effective for you  No Follow-up on file  Subjective:      Patient ID: Ary Land is a 51 y o  female  Chief Complaint   Patient presents with    Follow-up     Patient here for 6 month follow up on Migraine headache, insomnia        Here for follow up  Migraines intermittently      Hyperlipidemia   This is a chronic problem  The current episode started more than 1 year ago  The problem is controlled  Recent lipid tests were reviewed and are normal  There are no known factors aggravating her hyperlipidemia  She is currently on no antihyperlipidemic treatment  There are no compliance problems  There are no known risk factors for coronary artery disease         The following portions of the patient's history were reviewed and updated as appropriate: allergies, current medications, past family history, past medical history, past social history, past surgical history and problem list     Review of Systems   Constitutional: Negative  HENT: Negative  Eyes: Negative  Respiratory: Negative  Cardiovascular: Negative  Gastrointestinal: Negative  Endocrine: Negative  Genitourinary: Negative  Musculoskeletal: Negative  Allergic/Immunologic: Negative  Neurological: Negative  Hematological: Negative  Psychiatric/Behavioral: Negative  Current Outpatient Prescriptions   Medication Sig Dispense Refill    cholecalciferol (VITAMIN D3) 1,000 units tablet Take 1 tablet by mouth daily      Omega-3 Fatty Acids (FISH OIL) 1,000 mg Take 1,000 mg by mouth daily       No current facility-administered medications for this visit  Objective:    /70   Pulse 72   Resp 14   Ht 5' 1 61" (1 565 m)   Wt 62 7 kg (138 lb 3 2 oz)   BMI 25 60 kg/m²        Physical Exam   Constitutional: She appears well-developed and well-nourished  Eyes: Pupils are equal, round, and reactive to light  Neck: Normal range of motion  Neck supple  Cardiovascular: Normal rate, regular rhythm, normal heart sounds and intact distal pulses  Pulmonary/Chest: Effort normal and breath sounds normal    Abdominal: Soft  Bowel sounds are normal    Musculoskeletal: Normal range of motion  Neurological: She is alert  Skin: Skin is warm  Nursing note and vitals reviewed               Marv Orozco DO

## 2018-07-30 NOTE — PATIENT INSTRUCTIONS

## 2018-08-04 LAB
25(OH)D3 SERPL-MCNC: 27 NG/ML (ref 30–100)
ALBUMIN SERPL-MCNC: 4 G/DL (ref 3.6–5.1)
ALBUMIN/GLOB SERPL: 1.7 (CALC) (ref 1–2.5)
ALP SERPL-CCNC: 58 U/L (ref 33–115)
ALT SERPL-CCNC: 43 U/L (ref 6–29)
AST SERPL-CCNC: 21 U/L (ref 10–35)
BILIRUB SERPL-MCNC: 0.5 MG/DL (ref 0.2–1.2)
BUN SERPL-MCNC: 14 MG/DL (ref 7–25)
BUN/CREAT SERPL: ABNORMAL (CALC) (ref 6–22)
CALCIUM SERPL-MCNC: 9 MG/DL (ref 8.6–10.2)
CHLORIDE SERPL-SCNC: 105 MMOL/L (ref 98–110)
CHOLEST SERPL-MCNC: 309 MG/DL
CHOLEST/HDLC SERPL: 4.8 (CALC)
CO2 SERPL-SCNC: 27 MMOL/L (ref 20–31)
CREAT SERPL-MCNC: 1.01 MG/DL (ref 0.5–1.1)
GLOBULIN SER CALC-MCNC: 2.4 G/DL (CALC) (ref 1.9–3.7)
GLUCOSE SERPL-MCNC: 85 MG/DL (ref 65–99)
HDLC SERPL-MCNC: 64 MG/DL
LDLC SERPL CALC-MCNC: 216 MG/DL (CALC)
NONHDLC SERPL-MCNC: 245 MG/DL (CALC)
POTASSIUM SERPL-SCNC: 4.1 MMOL/L (ref 3.5–5.3)
PROT SERPL-MCNC: 6.4 G/DL (ref 6.1–8.1)
SL AMB EGFR AFRICAN AMERICAN: 76 ML/MIN/1.73M2
SL AMB EGFR NON AFRICAN AMERICAN: 65 ML/MIN/1.73M2
SODIUM SERPL-SCNC: 137 MMOL/L (ref 135–146)
TRIGL SERPL-MCNC: 135 MG/DL
TSH SERPL-ACNC: 1.77 MIU/L

## 2019-09-12 DIAGNOSIS — Z12.39 SCREENING FOR BREAST CANCER: Primary | ICD-10-CM

## 2019-09-24 ENCOUNTER — HOSPITAL ENCOUNTER (OUTPATIENT)
Dept: RADIOLOGY | Age: 49
Discharge: HOME | End: 2019-09-24
Payer: COMMERCIAL

## 2019-09-24 VITALS — WEIGHT: 140 LBS | HEIGHT: 62 IN | BODY MASS INDEX: 25.76 KG/M2

## 2019-09-24 DIAGNOSIS — Z12.39 SCREENING FOR BREAST CANCER: ICD-10-CM

## 2019-09-24 PROCEDURE — 77067 SCR MAMMO BI INCL CAD: CPT

## 2020-08-17 ENCOUNTER — TELEPHONE (OUTPATIENT)
Dept: FAMILY MEDICINE CLINIC | Facility: CLINIC | Age: 50
End: 2020-08-17

## 2020-08-17 ENCOUNTER — OFFICE VISIT (OUTPATIENT)
Dept: FAMILY MEDICINE CLINIC | Facility: CLINIC | Age: 50
End: 2020-08-17
Payer: COMMERCIAL

## 2020-08-17 VITALS
TEMPERATURE: 99.6 F | HEART RATE: 94 BPM | WEIGHT: 148.8 LBS | DIASTOLIC BLOOD PRESSURE: 80 MMHG | SYSTOLIC BLOOD PRESSURE: 128 MMHG | OXYGEN SATURATION: 98 % | RESPIRATION RATE: 16 BRPM | BODY MASS INDEX: 27.22 KG/M2

## 2020-08-17 DIAGNOSIS — M54.50 ACUTE BILATERAL LOW BACK PAIN WITHOUT SCIATICA: Primary | ICD-10-CM

## 2020-08-17 PROCEDURE — 3725F SCREEN DEPRESSION PERFORMED: CPT | Performed by: NURSE PRACTITIONER

## 2020-08-17 PROCEDURE — 1036F TOBACCO NON-USER: CPT | Performed by: NURSE PRACTITIONER

## 2020-08-17 PROCEDURE — 99213 OFFICE O/P EST LOW 20 MIN: CPT | Performed by: NURSE PRACTITIONER

## 2020-08-17 RX ORDER — LIDOCAINE 50 MG/G
1 PATCH TOPICAL DAILY
Qty: 30 PATCH | Refills: 1 | Status: SHIPPED | OUTPATIENT
Start: 2020-08-17 | End: 2021-01-07

## 2020-08-17 RX ORDER — NAPROXEN 500 MG/1
500 TABLET ORAL 2 TIMES DAILY WITH MEALS
Qty: 60 TABLET | Refills: 1 | Status: SHIPPED | OUTPATIENT
Start: 2020-08-17 | End: 2021-01-07

## 2020-08-17 NOTE — TELEPHONE ENCOUNTER
PT CALLED SHE IS HAVING LOWER BACK PAIN YOU HAVE NOTHING SOON AND SHE WOULD LIKE TO SEE YOU FOR THIS  ANYWHERE I CAN SQUEEZE HER IN SHE ASKED?  PLS ADVISE

## 2020-08-17 NOTE — TELEPHONE ENCOUNTER
Spoke to patient having lower back right side  Has been coming and going with the pain all summer long  Also feels like pain in the hip joint also  She is ok walking but sitting down she has a lot of pain

## 2020-08-17 NOTE — PROGRESS NOTES
Assessment/Plan:           Problem List Items Addressed This Visit        Other    Acute bilateral low back pain without sciatica - Primary    Relevant Medications    naproxen (NAPROSYN) 500 mg tablet    lidocaine (LIDODERM) 5 %    Other Relevant Orders    Ambulatory referral to Physical Therapy            Subjective:      Patient ID: Breanna Andres is a 45 y o  female  Back pain started end of may beginning of June  Low back  Right hip pain  No numbness or tingling in legs  No loss of bowel or bladder control  Tried otc pain patch and ibuprofen  Tried lidocaine patch and worked        The following portions of the patient's history were reviewed and updated as appropriate: allergies, current medications, past family history, past medical history, past social history, past surgical history and problem list     Review of Systems   Constitutional: Negative for fatigue and fever  HENT: Negative for congestion and rhinorrhea  Respiratory: Negative for cough and shortness of breath  Cardiovascular: Negative for chest pain and palpitations  Gastrointestinal: Negative for constipation and diarrhea  Genitourinary: Negative for dysuria and frequency  Musculoskeletal: Positive for back pain  Negative for myalgias  Neurological: Negative for dizziness and headaches  Hematological: Negative for adenopathy  Psychiatric/Behavioral: Negative for dysphoric mood  The patient is not nervous/anxious            Objective:      /80   Pulse 94   Temp 99 6 °F (37 6 °C)   Resp 16   Wt 67 5 kg (148 lb 12 8 oz)   SpO2 98%   BMI 27 22 kg/m²     Family History   Problem Relation Age of Onset    Coronary artery disease Mother     Hypothyroidism Mother     Hyperlipidemia Mother         Pure    Cancer Father     Diabetes Father     Colon cancer Father     Diabetes Maternal Grandmother     Diabetes Paternal Grandmother     No Known Problems Maternal Aunt      Past Medical History:   Diagnosis Date    Abnormal blood chemistry     Chest pain     HX OF CHEST PAIN STRESS     H/O  section     hx of  delivery    Hyperlipidemia      Social History     Socioeconomic History    Marital status: /Civil Union     Spouse name: Not on file    Number of children: Not on file    Years of education: Not on file    Highest education level: Not on file   Occupational History    Not on file   Social Needs    Financial resource strain: Not on file    Food insecurity     Worry: Not on file     Inability: Not on file    Transportation needs     Medical: Not on file     Non-medical: Not on file   Tobacco Use    Smoking status: Never Smoker    Smokeless tobacco: Never Used   Substance and Sexual Activity    Alcohol use: Yes     Comment: social/occasional    Drug use: No    Sexual activity: Yes   Lifestyle    Physical activity     Days per week: Not on file     Minutes per session: Not on file    Stress: Not on file   Relationships    Social connections     Talks on phone: Not on file     Gets together: Not on file     Attends Yarsani service: Not on file     Active member of club or organization: Not on file     Attends meetings of clubs or organizations: Not on file     Relationship status: Not on file    Intimate partner violence     Fear of current or ex partner: Not on file     Emotionally abused: Not on file     Physically abused: Not on file     Forced sexual activity: Not on file   Other Topics Concern    Not on file   Social History Narrative    Not on file       Current Outpatient Medications:     cholecalciferol (VITAMIN D3) 1,000 units tablet, Take 1 tablet by mouth daily, Disp: , Rfl:     lidocaine (LIDODERM) 5 %, Apply 1 patch topically daily Remove & Discard patch within 12 hours or as directed by MD, Disp: 30 patch, Rfl: 1    naproxen (NAPROSYN) 500 mg tablet, Take 1 tablet (500 mg total) by mouth 2 (two) times a day with meals, Disp: 60 tablet, Rfl: 1    Omega-3 Fatty Acids (FISH OIL) 1,000 mg, Take 1,000 mg by mouth daily, Disp: , Rfl:   No Known Allergies     Physical Exam  Vitals signs and nursing note reviewed  Constitutional:       Appearance: Normal appearance  HENT:      Head: Normocephalic and atraumatic  Right Ear: Tympanic membrane, ear canal and external ear normal       Left Ear: Tympanic membrane and ear canal normal       Nose: Nose normal       Mouth/Throat:      Mouth: Mucous membranes are moist    Eyes:      Conjunctiva/sclera: Conjunctivae normal    Neck:      Musculoskeletal: Normal range of motion and neck supple  Cardiovascular:      Rate and Rhythm: Normal rate and regular rhythm  Pulses: Normal pulses  Heart sounds: Normal heart sounds  Pulmonary:      Effort: Pulmonary effort is normal       Breath sounds: Normal breath sounds  Abdominal:      General: Bowel sounds are normal    Musculoskeletal:      Lumbar back: She exhibits decreased range of motion, pain and spasm  Comments: Neg straight leg raise  Able to walk on toes and heels without difficulty     Skin:     General: Skin is warm and dry  Capillary Refill: Capillary refill takes less than 2 seconds  Neurological:      Mental Status: She is alert and oriented to person, place, and time  Psychiatric:         Mood and Affect: Mood normal          Behavior: Behavior normal          Thought Content: Thought content normal          Judgment: Judgment normal        BMI Counseling: Body mass index is 27 22 kg/m²  The BMI is above normal  Nutrition recommendations include reducing portion sizes, decreasing overall calorie intake, 3-5 servings of fruits/vegetables daily, reducing fast food intake, consuming healthier snacks, decreasing soda and/or juice intake, moderation in carbohydrate intake, increasing intake of lean protein, reducing intake of saturated fat and trans fat and reducing intake of cholesterol   Exercise recommendations include moderate aerobic physical activity for 150 minutes/week, exercising 3-5 times per week and strength training exercises

## 2020-08-18 PROBLEM — M54.50 ACUTE BILATERAL LOW BACK PAIN WITHOUT SCIATICA: Status: ACTIVE | Noted: 2020-08-18

## 2020-09-30 ENCOUNTER — TELEPHONE (OUTPATIENT)
Dept: FAMILY MEDICINE CLINIC | Facility: CLINIC | Age: 50
End: 2020-09-30

## 2020-10-01 ENCOUNTER — HOSPITAL ENCOUNTER (OUTPATIENT)
Dept: RADIOLOGY | Age: 50
Discharge: HOME | End: 2020-10-01
Payer: COMMERCIAL

## 2020-10-01 VITALS — HEIGHT: 62 IN | WEIGHT: 148 LBS | BODY MASS INDEX: 27.23 KG/M2

## 2020-10-01 DIAGNOSIS — Z12.31 ENCOUNTER FOR SCREENING MAMMOGRAM FOR MALIGNANT NEOPLASM OF BREAST: ICD-10-CM

## 2020-10-01 PROCEDURE — 77067 SCR MAMMO BI INCL CAD: CPT

## 2020-10-01 PROCEDURE — 77063 BREAST TOMOSYNTHESIS BI: CPT

## 2020-10-20 ENCOUNTER — TELEPHONE (OUTPATIENT)
Dept: OBGYN CLINIC | Facility: CLINIC | Age: 50
End: 2020-10-20

## 2020-10-23 ENCOUNTER — HOSPITAL ENCOUNTER (OUTPATIENT)
Dept: MAMMOGRAPHY | Facility: CLINIC | Age: 50
Discharge: HOME | End: 2020-10-23
Payer: COMMERCIAL

## 2020-10-23 ENCOUNTER — HOSPITAL ENCOUNTER (OUTPATIENT)
Dept: ULTRASOUND IMAGING | Facility: CLINIC | Age: 50
Discharge: HOME | End: 2020-10-23
Payer: COMMERCIAL

## 2020-10-23 VITALS — HEIGHT: 62 IN | WEIGHT: 148 LBS | BODY MASS INDEX: 27.23 KG/M2

## 2020-10-23 DIAGNOSIS — R92.8 ABNORMAL MAMMOGRAM: ICD-10-CM

## 2020-10-23 DIAGNOSIS — R92.8 ABNORMAL SCREENING MAMMOGRAM: ICD-10-CM

## 2020-10-23 PROCEDURE — 76642 ULTRASOUND BREAST LIMITED: CPT

## 2020-10-23 PROCEDURE — 77065 DX MAMMO INCL CAD UNI: CPT

## 2020-10-23 PROCEDURE — G0279 TOMOSYNTHESIS, MAMMO: HCPCS

## 2021-01-07 ENCOUNTER — OFFICE VISIT (OUTPATIENT)
Dept: FAMILY MEDICINE CLINIC | Facility: CLINIC | Age: 51
End: 2021-01-07
Payer: COMMERCIAL

## 2021-01-07 VITALS
OXYGEN SATURATION: 98 % | HEIGHT: 62 IN | BODY MASS INDEX: 26.98 KG/M2 | DIASTOLIC BLOOD PRESSURE: 90 MMHG | HEART RATE: 86 BPM | WEIGHT: 146.6 LBS | SYSTOLIC BLOOD PRESSURE: 144 MMHG | RESPIRATION RATE: 12 BRPM | TEMPERATURE: 96.3 F

## 2021-01-07 DIAGNOSIS — Z12.12 SCREENING FOR COLORECTAL CANCER: ICD-10-CM

## 2021-01-07 DIAGNOSIS — E55.9 VITAMIN D DEFICIENCY: ICD-10-CM

## 2021-01-07 DIAGNOSIS — Z00.00 ANNUAL PHYSICAL EXAM: Primary | ICD-10-CM

## 2021-01-07 DIAGNOSIS — Z12.11 SCREENING FOR COLORECTAL CANCER: ICD-10-CM

## 2021-01-07 DIAGNOSIS — E78.5 MILD HYPERLIPIDEMIA: ICD-10-CM

## 2021-01-07 PROBLEM — M54.50 ACUTE BILATERAL LOW BACK PAIN WITHOUT SCIATICA: Status: RESOLVED | Noted: 2020-08-18 | Resolved: 2021-01-07

## 2021-01-07 PROCEDURE — 99396 PREV VISIT EST AGE 40-64: CPT | Performed by: FAMILY MEDICINE

## 2021-01-07 NOTE — PROGRESS NOTES
[address] [facility]    NAME: Bg Andrea  AGE: 45 y o  SEX: female  : 1970     DATE: 2021     Assessment and Plan:     Problem List Items Addressed This Visit        Other    Mild hyperlipidemia    Relevant Orders    CBC    Comprehensive metabolic panel    Lipid Panel with Direct LDL reflex    TSH, 3rd generation with Free T4 reflex    Vitamin D deficiency    Relevant Orders    Vitamin D 25 hydroxy    Annual physical exam - Primary    Screening for colorectal cancer    Relevant Orders    Ambulatory referral to Colorectal Surgery          Immunizations and preventive care screenings were discussed with patient today  Appropriate education was printed on patient's after visit summary  Counseling:  Dental Health: discussed importance of regular tooth brushing, flossing, and dental visits  · Exercise: the importance of regular exercise/physical activity was discussed  Recommend exercise 3-5 times per week for at least 30 minutes  BMI Counseling: Body mass index is 27 22 kg/m²  The BMI is above normal  Nutrition recommendations include encouraging healthy choices of fruits and vegetables  Exercise recommendations include exercising 3-5 times per week  No pharmacotherapy was ordered  Return in 1 year (on 2022)  Chief Complaint:     Chief Complaint   Patient presents with    Annual Exam      History of Present Illness:     Adult Annual Physical   Patient here for a comprehensive physical exam  The patient reports no problems  Diet and Physical Activity  · Diet/Nutrition: well balanced diet  · Exercise: no formal exercise  Depression Screening  PHQ-9 Depression Screening    PHQ-9:   Frequency of the following problems over the past two weeks:           General Health  · Sleep: sleeps poorly  · Hearing: normal - bilateral   · Vision: vision problems: needs glasses     · Dental: regular dental visits and brushes teeth twice daily  /GYN Health  · Patient is: perimenopausal  · Last menstrual period: irregular  · Contraceptive method: n/a  Review of Systems:     Review of Systems   Constitutional: Positive for fatigue  HENT: Negative  Eyes: Negative  Respiratory: Negative  Cardiovascular: Negative  Gastrointestinal: Negative  Endocrine: Negative  Genitourinary: Negative  Musculoskeletal: Negative  Allergic/Immunologic: Negative  Neurological: Negative  Hematological: Negative  Psychiatric/Behavioral: Positive for sleep disturbance        Past Medical History:     Past Medical History:   Diagnosis Date    Abnormal blood chemistry     Acute bilateral low back pain without sciatica 2020    Chest pain     HX OF CHEST PAIN STRESS     H/O  section     hx of  delivery    Hyperlipidemia       Past Surgical History:     Past Surgical History:   Procedure Laterality Date    CYSTECTOMY Right     Ovarian    INDUCED       Surgical Treatment Of Spontaneous     TOOTH EXTRACTION Bilateral     TUBAL LIGATION      Midline      Social History:        Social History     Socioeconomic History    Marital status: /Civil Union     Spouse name: Not on file    Number of children: Not on file    Years of education: Not on file    Highest education level: Not on file   Occupational History    Not on file   Social Needs    Financial resource strain: Not on file    Food insecurity     Worry: Not on file     Inability: Not on file   [employer] needs     Medical: Not on file     Non-medical: Not on file   Tobacco Use    Smoking status: Never Smoker    Smokeless tobacco: Never Used   Substance and Sexual Activity    Alcohol use: Yes     Comment: social/occasional    Drug use: No    Sexual activity: Yes   Lifestyle    Physical activity     Days per week: Not on file     Minutes per session: Not on file    Stress: Not on file Relationships    Social connections     Talks on phone: Not on file     Gets together: Not on file     Attends Yazidism service: Not on file     Active member of club or organization: Not on file     Attends meetings of clubs or organizations: Not on file     Relationship status: Not on file    Intimate partner violence     Fear of current or ex partner: Not on file     Emotionally abused: Not on file     Physically abused: Not on file     Forced sexual activity: Not on file   Other Topics Concern    Not on file   Social History Narrative    Not on file      Family History:     Family History   Problem Relation Age of Onset    Coronary artery disease Mother     Hypothyroidism Mother     Hyperlipidemia Mother         Pure    Cancer Father     Diabetes Father     Colon cancer Father 79    Diabetes Maternal Grandmother     Diabetes Paternal Grandmother     No Known Problems Maternal Aunt     No Known Problems Maternal Grandfather     No Known Problems Paternal Grandfather       Current Medications:     Current Outpatient Medications   Medication Sig Dispense Refill    cholecalciferol (VITAMIN D3) 1,000 units tablet Take 1 tablet by mouth daily       No current facility-administered medications for this visit  Allergies:     No Known Allergies   Physical Exam:     /90   Pulse 86   Temp (!) 96 3 °F (35 7 °C) (Skin)   Resp 12   Ht 5' 1 54" (1 563 m)   Wt 66 5 kg (146 lb 9 6 oz)   SpO2 98%   BMI 27 22 kg/m²     Physical Exam  Vitals signs and nursing note reviewed  Constitutional:       Appearance: She is well-developed  HENT:      Head: Normocephalic and atraumatic  Right Ear: Tympanic membrane, ear canal and external ear normal       Left Ear: Tympanic membrane, ear canal and external ear normal       Nose: Nose normal    Eyes:      Extraocular Movements: Extraocular movements intact        Conjunctiva/sclera: Conjunctivae normal       Pupils: Pupils are equal, round, and reactive to light  Neck:      Musculoskeletal: Normal range of motion and neck supple  Cardiovascular:      Rate and Rhythm: Normal rate and regular rhythm  Heart sounds: Normal heart sounds  Pulmonary:      Effort: Pulmonary effort is normal       Breath sounds: Normal breath sounds  Abdominal:      General: Abdomen is flat  Bowel sounds are normal       Palpations: Abdomen is soft  Musculoskeletal: Normal range of motion  Skin:     General: Skin is warm and dry  Capillary Refill: Capillary refill takes less than 2 seconds  Neurological:      General: No focal deficit present  Mental Status: She is alert and oriented to person, place, and time  Psychiatric:         Mood and Affect: Mood normal          Behavior: Behavior normal          Thought Content:  Thought content normal          Judgment: Judgment normal           Pierre Pino DO  [address] [facility]

## 2021-01-07 NOTE — PATIENT INSTRUCTIONS

## 2021-01-21 ENCOUNTER — OFFICE VISIT (OUTPATIENT)
Dept: OBGYN CLINIC | Facility: CLINIC | Age: 51
End: 2021-01-21
Payer: COMMERCIAL

## 2021-01-21 VITALS
SYSTOLIC BLOOD PRESSURE: 128 MMHG | HEIGHT: 62 IN | WEIGHT: 145 LBS | BODY MASS INDEX: 26.68 KG/M2 | DIASTOLIC BLOOD PRESSURE: 82 MMHG

## 2021-01-21 DIAGNOSIS — Z01.419 ROUTINE GYNECOLOGICAL EXAMINATION: Primary | ICD-10-CM

## 2021-01-21 DIAGNOSIS — N63.10 BREAST MASS, RIGHT: ICD-10-CM

## 2021-01-21 PROCEDURE — 1036F TOBACCO NON-USER: CPT | Performed by: PHYSICIAN ASSISTANT

## 2021-01-21 PROCEDURE — 3008F BODY MASS INDEX DOCD: CPT | Performed by: PHYSICIAN ASSISTANT

## 2021-01-21 PROCEDURE — 99386 PREV VISIT NEW AGE 40-64: CPT | Performed by: PHYSICIAN ASSISTANT

## 2021-01-21 NOTE — PROGRESS NOTES
Assessment/Plan   Problem List Items Addressed This Visit        Other    Routine gynecological examination - Primary     Pap guidelines reviewed  Pap with HPV done today  Reviewed menses with patient  Continue to monitor 1 year no menses equals menopause  If has bleeding after that year needs to call to evaluate postmenopausal bleeding  Reviewed options for hot flashes, including dietary modifications, exercise, black cohosh, estrogen, cooling gels, HRT  Reviewed options for vaginal dryness including coconut oil, HRT, Intrarosa, Osphena  Patient will try coconut oil for now, reviewed option of seeing pelvic floor physical therapy as well to help relax muscles  Return to office for annual or as needed  Relevant Orders    Pap Lb, HPV-hr      Other Visit Diagnoses     Breast mass, right        Relevant Orders    Mammo diagnostic right w 3d & cad          Subjective:     Patient ID: Lita Vences is a 45 y o  y o  female  HPI  46 yo seen for annual exam    Menses irregular, every 2-6 weeks  Have been lighter and tolerable  Does admit to hot flashes and night sweats, worse and more constant over the last month, tolerable at this time  Also admits to pain with intercourse  Mostly with insertion and then afterwards  Difficult for her to enjoy intercourse and achieve orgasm due to discomfort  Has used water based lubricants without much relief  Last pap: 10/31/2017 NILM (-)HRHPV  Last mammogram: 10/1/2020 left breast BIRADS 1: Negative  Right breast BIRADS 0  10/23/2020 BIRAD 3: Benign  Probably benign  4 mm nodule of right breast  Recommend follow up in 6 months  Has script for colonoscopy, planning to schedule     The following portions of the patient's history were reviewed and updated as appropriate:   She  has a past medical history of Abnormal blood chemistry, Acute bilateral low back pain without sciatica (2020), Chest pain, H/O  section, Hyperlipidemia, Migraine, Miscarriage (), Rh incompatibility (), and Varicella ()  She   Patient Active Problem List    Diagnosis Date Noted    Routine gynecological examination 2021    Annual physical exam 2021    Screening for colorectal cancer 2021    Well adult exam 2018    Mild hyperlipidemia 2018    Vitamin D deficiency 2018    Irregular menses 10/31/2017    Insomnia 2015    Patellofemoral dysfunction 10/06/2014    Headache, migraine 2012     She  has a past surgical history that includes Tooth extraction (Bilateral); Cystectomy (Right); Induced ; Tubal ligation; and  section (1997)  Her family history includes Cancer in her father; Colon cancer (age of onset: 79) in her father; Coronary artery disease in her mother; Diabetes in her father, maternal grandmother, and paternal grandmother; Heart attack in her maternal grandfather; Hyperlipidemia in her mother; Hypothyroidism in her mother; Migraines in her mother; No Known Problems in her maternal aunt and paternal grandfather; Osteoporosis in her mother; Stroke in her paternal grandmother; Thyroid disease in her mother  She  reports that she has never smoked  She has never used smokeless tobacco  She reports previous alcohol use  She reports that she does not use drugs  Current Outpatient Medications   Medication Sig Dispense Refill    cholecalciferol (VITAMIN D3) 1,000 units tablet Take 1 tablet by mouth daily       No current facility-administered medications for this visit  She has No Known Allergies       Menstrual History:  OB History        2    Para   2    Term   2            AB        Living           SAB        TAB        Ectopic        Multiple        Live Births                    Patient's last menstrual period was 2020 (approximate)  Review of Systems   Constitutional: Negative for fatigue, fever and unexpected weight change     HENT: Negative for dental problem and sinus pressure  Eyes: Negative for visual disturbance  Respiratory: Negative for cough, shortness of breath and wheezing  Cardiovascular: Negative for chest pain  Gastrointestinal: Negative for abdominal pain, blood in stool, constipation, diarrhea, nausea and vomiting  Endocrine: Negative for polydipsia  Genitourinary: Negative for difficulty urinating, dyspareunia, dysuria, frequency, hematuria, pelvic pain and urgency  Musculoskeletal: Negative for arthralgias and back pain  Neurological: Negative for dizziness, seizures, light-headedness and headaches  Psychiatric/Behavioral: Negative for suicidal ideas  The patient is not nervous/anxious  Objective:  Vitals:    01/21/21 1335   BP: 128/82   BP Location: Left arm   Patient Position: Sitting   Cuff Size: Standard   Weight: 65 8 kg (145 lb)   Height: 5' 2" (1 575 m)      Physical Exam  Constitutional:       Appearance: Normal appearance  She is well-developed  Genitourinary:      Vulva, urethra, bladder, vagina and uterus normal       No vulval condylomata, lesion, tenderness, ulcerations, Bartholin's cyst or rash noted  No signs of labial injury  No labial fusion  No inguinal adenopathy present in the right or left side  No urethral prolapse, pain, swelling, tenderness, caruncle, mass or diverticulum present  Bladder is not distended or tender  No signs of injury or lesions in the vagina  No vaginal discharge, erythema, tenderness or bleeding  No cervical motion tenderness, discharge or lesion  Uterus is not enlarged, tender, irregular or mobile  No uterine mass detected  No right or left adnexal mass present  Right adnexa not tender or full  Left adnexa not tender or full  HENT:      Head: Normocephalic and atraumatic  Neck:      Thyroid: No thyromegaly  Cardiovascular:      Rate and Rhythm: Normal rate and regular rhythm  Heart sounds: Normal heart sounds   No murmur  No friction rub  No gallop  Pulmonary:      Effort: Pulmonary effort is normal  No respiratory distress  Breath sounds: Normal breath sounds  No wheezing  Chest:      Breasts: Breasts are symmetrical          Right: Normal  No swelling, bleeding, inverted nipple, mass, nipple discharge, skin change or tenderness  Left: Normal  No swelling, bleeding, inverted nipple, mass, nipple discharge, skin change or tenderness  Abdominal:      General: There is no distension  Palpations: Abdomen is soft  There is no mass  Tenderness: There is no abdominal tenderness  There is no guarding or rebound  Hernia: No hernia is present  Lymphadenopathy:      Cervical: No cervical adenopathy  Upper Body:      Right upper body: No supraclavicular, axillary or pectoral adenopathy  Left upper body: No supraclavicular, axillary or pectoral adenopathy  Lower Body: No right inguinal adenopathy  No left inguinal adenopathy  Neurological:      Mental Status: She is alert and oriented to person, place, and time  Skin:     General: Skin is warm and dry     Psychiatric:         Behavior: Behavior normal

## 2021-01-21 NOTE — ASSESSMENT & PLAN NOTE
Pap guidelines reviewed  Pap with HPV done today  Reviewed menses with patient  Continue to monitor 1 year no menses equals menopause  If has bleeding after that year needs to call to evaluate postmenopausal bleeding  Reviewed options for hot flashes, including dietary modifications, exercise, black cohosh, estrogen, cooling gels, HRT  Reviewed options for vaginal dryness including coconut oil, HRT, Intrarosa, Osphena  Patient will try coconut oil for now, reviewed option of seeing pelvic floor physical therapy as well to help relax muscles  Return to office for annual or as needed

## 2021-01-29 ENCOUNTER — TELEPHONE (OUTPATIENT)
Dept: OBGYN CLINIC | Facility: CLINIC | Age: 51
End: 2021-01-29

## 2021-01-29 NOTE — TELEPHONE ENCOUNTER
----- Message from Lucero Pacheco PA-C sent at 1/27/2021  1:47 PM EST -----  Lab german pap that needs to be recoded

## 2021-02-19 LAB
CYTOLOGIST CVX/VAG CYTO: NORMAL
DX ICD CODE: NORMAL
HPV I/H RISK 4 DNA CVX QL PROBE+SIG AMP: NEGATIVE
OTHER STN SPEC: NORMAL
PATH REPORT.FINAL DX SPEC: NORMAL
SL AMB NOTE:: NORMAL
SL AMB SPECIMEN ADEQUACY: NORMAL
SL AMB TEST METHODOLOGY: NORMAL

## 2021-03-17 ENCOUNTER — LAB REQUISITION (OUTPATIENT)
Dept: LAB | Facility: HOSPITAL | Age: 51
End: 2021-03-17
Payer: COMMERCIAL

## 2021-03-17 DIAGNOSIS — K63.5 POLYP OF COLON: ICD-10-CM

## 2021-03-17 DIAGNOSIS — Z80.0 FAMILY HISTORY OF MALIGNANT NEOPLASM OF DIGESTIVE ORGANS: ICD-10-CM

## 2021-03-17 PROCEDURE — 88305 TISSUE EXAM BY PATHOLOGIST: CPT | Performed by: PATHOLOGY

## 2021-09-22 ENCOUNTER — TELEMEDICINE (OUTPATIENT)
Dept: FAMILY MEDICINE CLINIC | Facility: CLINIC | Age: 51
End: 2021-09-22
Payer: COMMERCIAL

## 2021-09-22 DIAGNOSIS — U07.1 COVID-19: Primary | ICD-10-CM

## 2021-09-22 PROCEDURE — 99213 OFFICE O/P EST LOW 20 MIN: CPT | Performed by: FAMILY MEDICINE

## 2021-09-22 PROCEDURE — 3725F SCREEN DEPRESSION PERFORMED: CPT | Performed by: FAMILY MEDICINE

## 2021-09-22 RX ORDER — ACETAMINOPHEN 500 MG
500 TABLET ORAL EVERY 6 HOURS PRN
COMMUNITY
End: 2022-01-11

## 2021-09-22 NOTE — PROGRESS NOTES
COVID-19 Outpatient Progress Note    Assessment/Plan:    Problem List Items Addressed This Visit        Other    COVID-19 - Primary     Day 4  Recommended vitamin d, c, and zinc  Will get pulse ox  Follow up tomorrow              Disposition:     I recommended self-quarantine for 10 days and to watch for symptoms until 14 days after exposure  If patient were to develop symptoms, they should self isolate and call our office for further guidance  I have spent 10 minutes directly with the patient  Verification of patient location:    Patient is located in the following state in which I hold an active license [place]    Encounter provider Fran Allen DO    Provider located at [place] [address] [place] [address]    Recent Visits  No visits were found meeting these conditions  Showing recent visits within past 7 days and meeting all other requirements  Today's Visits  Date Type Provider Dept   09/22/21 Telemedicine Adan Savory, DO [facility]   Showing today's visits and meeting all other requirements  Future Appointments  No visits were found meeting these conditions  Showing future appointments within next 150 days and meeting all other requirements     This virtual check-in was done via [employer] and patient was informed that this is a secure, HIPAA-compliant platform  She agrees to proceed  Patient agrees to participate in a virtual check in via telephone or video visit instead of presenting to the office to address urgent/immediate medical needs  Patient is aware this is a billable service  After connecting through [facility], the patient was identified by name and date of birth  Sultana King was informed that this was a telemedicine visit and that the exam was being conducted confidentially over secure lines  Sultana King acknowledged consent and understanding of privacy and security of the telemedicine visit   I informed the patient that I have reviewed her record in [address] [place] [address] and presented the opportunity for her to ask any questions regarding the visit today  The patient agreed to participate  Subjective:   Ramin Cedillo is a 45 y o  female who is concerned about COVID-19  Patient's symptoms include fever (maybe unsure-no thermomter), chills, fatigue, nasal congestion, cough, myalgias and headache (improved)  Patient denies rhinorrhea, sore throat, anosmia, loss of taste, shortness of breath, chest tightness, nausea, vomiting and diarrhea       Date of symptom onset: 2021  Date of exposure: 2021  COVID-19 vaccination status: Not vaccinated    Exposure:   Contact with a person who is under investigation (PUI) for or who is positive for COVID-19 within the last 14 days?: Yes    Hospitalized recently for fever and/or lower respiratory symptoms?: No      Currently a healthcare worker that is involved in direct patient care?: No      Works in a special setting where the risk of COVID-19 transmission may be high? (this may include long-term care, correctional and FDC facilities; homeless shelters; assisted-living facilities and group homes ): No      Resident in a special setting where the risk of COVID-19 transmission may be high? (this may include long-term care, correctional and FDC facilities; homeless shelters; assisted-living facilities and group homes ): No      [employer] tested positive for covid over weekend   Tested at work    No results found for: [address] [facility], [address] [facility], [address] [facility],[address]  Past Medical History:   Diagnosis Date    Abnormal blood chemistry     Acute bilateral low back pain without sciatica 2020    Chest pain     HX OF CHEST PAIN STRESS     H/O  section     hx of  delivery    Hyperlipidemia     Migraine     Miscarriage     Rh incompatibility     Varicella 1979     Past Surgical History:   Procedure Laterality Date     SECTION  1997    CYSTECTOMY Right     Ovarian    INDUCED       Surgical Treatment Of Spontaneous     TOOTH EXTRACTION Bilateral     TUBAL LIGATION      Midline     Current Outpatient Medications   Medication Sig Dispense Refill    acetaminophen (TYLENOL) 500 mg tablet Take 500 mg by mouth every 6 (six) hours as needed for mild pain      cholecalciferol (VITAMIN D3) 1,000 units tablet Take 1 tablet by mouth daily       No current facility-administered medications for this visit  No Known Allergies    Review of Systems   Constitutional: Positive for chills, fatigue and fever (maybe unsure-no thermomter)  HENT: Positive for congestion  Negative for rhinorrhea and sore throat  Respiratory: Positive for cough  Negative for chest tightness and shortness of breath  Gastrointestinal: Negative for diarrhea, nausea and vomiting  Musculoskeletal: Positive for myalgias  Neurological: Positive for headaches (improved)  Objective:    Vitals:       Physical Exam  Constitutional:       Appearance: Normal appearance  Eyes:      Extraocular Movements: Extraocular movements intact  Pupils: Pupils are equal, round, and reactive to light  Pulmonary:      Effort: No respiratory distress  Breath sounds: No wheezing  Musculoskeletal:         General: Normal range of motion  Neurological:      General: No focal deficit present  Mental Status: She is alert and oriented to person, place, and time  Psychiatric:         Mood and Affect: Mood normal          Behavior: Behavior normal          Thought Content: Thought content normal          Judgment: Judgment normal          VIRTUAL VISIT DISCLAIMER    Christine Mcdaniel verbally agrees to participate in [employer]   Pt is aware that [employer] could be limited without vital signs or the ability to perform a full hands-on physical Barron Arn understands she or the provider may request at any time to terminate the video visit and request the patient to seek care or treatment in person

## 2021-09-23 ENCOUNTER — TELEMEDICINE (OUTPATIENT)
Dept: FAMILY MEDICINE CLINIC | Facility: CLINIC | Age: 51
End: 2021-09-23
Payer: COMMERCIAL

## 2021-09-23 VITALS — OXYGEN SATURATION: 98 % | HEART RATE: 97 BPM

## 2021-09-23 DIAGNOSIS — U07.1 COVID-19: Primary | ICD-10-CM

## 2021-09-23 PROCEDURE — 99213 OFFICE O/P EST LOW 20 MIN: CPT | Performed by: FAMILY MEDICINE

## 2021-09-23 NOTE — PROGRESS NOTES
COVID-19 Outpatient Progress Note    Assessment/Plan:    Problem List Items Addressed This Visit        Other    COVID-19 - Primary     Day 5  Taking vitamins  Pulse ox stable              Disposition:     I recommended continued isolation until at least 24 hours have passed since recovery defined as resolution of fever without the use of fever-reducing medications AND improvement in COVID symptoms AND 10 days have passed since onset of symptoms (or 10 days have passed since date of first positive viral diagnostic test for asymptomatic patients)  Job won't let her go back until Oct 1, 2021    I have spent 10 minutes directly with the patient  Verification of patient location:    Patient is located in the following state in which I hold an active license [place]    Encounter provider Vishal Eaton DO    Provider located at [address] [address]    Recent Visits  Date Type Provider Dept   09/22/21 [address] [place] [address],  [address] recent visits within past 7 days and meeting all other requirements  Today's Visits  Date Type Provider Dept   09/23/21 Telemedicine DO [facility]   Showing today's visits and meeting all other requirements  Future Appointments  No visits were found meeting these conditions  Showing future appointments within next 150 days and meeting all other requirements       Patient agrees to participate in a virtual check in via telephone or video visit instead of presenting to the office to address urgent/immediate medical needs  Patient is aware this is a billable service  After connecting through [facility], the patient was identified by name and date of birth  Meg Hernandez was informed that this was a telemedicine visit and that the exam was being conducted confidentially over secure lines  My office door was closed  No one else was in the room   Meg Hernandez acknowledged consent and understanding of privacy and security of the telemedicine visit  I informed the patient that I have reviewed her record in Epic and presented the opportunity for her to ask any questions regarding the visit today  The patient agreed to participate  Subjective:   Cristal Gaviria is a 45 y o  female who has been screened for COVID-19  Symptom change since last report: unchanged  Patient's symptoms include fatigue, nasal congestion, loss of taste, cough, nausea, vomiting and myalgias  Patient denies fever, shortness of breath, chest tightness, diarrhea and headaches  Date of symptom onset: 2021  Date of exposure: 2021  Date of positive COVID-19 PCR: 2021  COVID-19 vaccination status: Not vaccinated    Manuel Bruno has been staying home and has isolated themselves in her home  She is taking care to not share personal items and is cleaning all surfaces that are touched often, like counters, tabletops, and doorknobs using household cleaning sprays or wipes  She is wearing a mask when she leaves her room       Lab Results   Component Value Date    SARSCOV2 Positive (A) 2021     Past Medical History:   Diagnosis Date    Abnormal blood chemistry     Acute bilateral low back pain without sciatica 2020    Chest pain     HX OF CHEST PAIN STRESS     H/O  section     hx of  delivery    Hyperlipidemia     Migraine     Miscarriage     Rh incompatibility     Varicella 1979     Past Surgical History:   Procedure Laterality Date     SECTION  1997    CYSTECTOMY Right     Ovarian    INDUCED       Surgical Treatment Of Spontaneous     TOOTH EXTRACTION Bilateral     TUBAL LIGATION      Midline     Current Outpatient Medications   Medication Sig Dispense Refill    acetaminophen (TYLENOL) 500 mg tablet Take 500 mg by mouth every 6 (six) hours as needed for mild pain      cholecalciferol (VITAMIN D3) 1,000 units tablet Take 1 tablet by mouth daily       No current facility-administered medications for this visit  No Known Allergies    Review of Systems   Constitutional: Positive for fatigue  Negative for fever  HENT: Positive for congestion  Respiratory: Positive for cough  Negative for chest tightness and shortness of breath  Gastrointestinal: Positive for nausea and vomiting  Negative for diarrhea  Musculoskeletal: Positive for myalgias  Neurological: Negative for headaches  Objective:    Vitals:    09/23/21 0926   Pulse: 97   SpO2: 98%       Physical Exam  Vitals reviewed  Constitutional:       Appearance: Normal appearance  HENT:      Head: Normocephalic and atraumatic  Eyes:      Extraocular Movements: Extraocular movements intact  Pupils: Pupils are equal, round, and reactive to light  Pulmonary:      Effort: Pulmonary effort is normal  No respiratory distress  Breath sounds: No wheezing  Neurological:      General: No focal deficit present  Mental Status: She is alert and oriented to person, place, and time  Psychiatric:         Mood and Affect: Mood normal          Behavior: Behavior normal          Thought Content: Thought content normal          Judgment: Judgment normal          VIRTUAL VISIT DISCLAIMER    Diana Gonzalez verbally agrees to participate in [employer]  Pt is aware that [employer] could be limited without vital signs or the ability to perform a full hands-on physical Prudence Flurry understands she or the provider may request at any time to terminate the video visit and request the patient to seek care or treatment in person

## 2021-09-27 ENCOUNTER — TELEMEDICINE (OUTPATIENT)
Dept: FAMILY MEDICINE CLINIC | Facility: CLINIC | Age: 51
End: 2021-09-27
Payer: COMMERCIAL

## 2021-09-27 VITALS — OXYGEN SATURATION: 98 % | HEART RATE: 78 BPM

## 2021-09-27 DIAGNOSIS — U07.1 COVID-19: Primary | ICD-10-CM

## 2021-09-27 PROCEDURE — 99213 OFFICE O/P EST LOW 20 MIN: CPT | Performed by: FAMILY MEDICINE

## 2021-09-27 NOTE — PROGRESS NOTES
COVID-19 Outpatient Progress Note    Assessment/Plan:    Problem List Items Addressed This Visit        Other    COVID-19 - Primary     Day 9  Cont vitamins  Stable   Follow up wednesday              Disposition:     I recommended continued isolation until at least 24 hours have passed since recovery defined as resolution of fever without the use of fever-reducing medications AND improvement in COVID symptoms AND 10 days have passed since onset of symptoms (or 10 days have passed since date of first positive viral diagnostic test for asymptomatic patients)  I have spent 9 minutes directly with the patient  Verification of patient location:    Patient is located in the following state in which I hold an active license [place]    Encounter provider Davis Paredes DO    Provider located at [address] [place] [phone]    Recent Visits  Date Type Provider Dept   09/23/21 [address] [place] [address], [address] [facility] [address]   09/22/21 [address] [place] [address],  [facility]   Showing recent visits within past 7 days and meeting all other requirements  Today's Visits  Date Type Provider Dept   09/27/21 Telemedicine Davis Paredes DO [facility]   Showing today's visits and meeting all other requirements  Future Appointments  No visits were found meeting these conditions  Showing future appointments within next 150 days and meeting all other requirements       Patient agrees to participate in a virtual check in via telephone or video visit instead of presenting to the office to address urgent/immediate medical needs  Patient is aware this is a billable service  After connecting through [facility], the patient was identified by name and date of birth  Celia Marley was informed that this was a telemedicine visit and that the exam was being conducted confidentially over secure lines  My office door was closed  No one else was in the room   Abbey Giles Irina Perez acknowledged consent and understanding of privacy and security of the telemedicine visit  I informed the patient that I have reviewed her record in Epic and presented the opportunity for her to ask any questions regarding the visit today  The patient agreed to participate  Subjective:   Julianna Johnston is a 45 y o  female who has been screened for COVID-19  Symptom change since last report: unchanged  Patient's symptoms include fatigue, nasal congestion, anosmia, loss of taste, cough and myalgias (improved)  Patient denies fever, chills, shortness of breath, chest tightness, nausea, vomiting and diarrhea  Date of symptom onset: 2021  Date of exposure: 2021  Date of positive COVID-19 PCR: 2021  COVID-19 vaccination status: Not vaccinated    Thelma Cutler has been staying home and has isolated themselves in her home  She is taking care to not share personal items and is cleaning all surfaces that are touched often, like counters, tabletops, and doorknobs using household cleaning sprays or wipes  She is wearing a mask when she leaves her room       Lab Results   Component Value Date    SARSCOV2 Positive (A) 2021     Past Medical History:   Diagnosis Date    Abnormal blood chemistry     Acute bilateral low back pain without sciatica 2020    Chest pain     HX OF CHEST PAIN STRESS     H/O  section     hx of  delivery    Hyperlipidemia     Migraine     Miscarriage     Rh incompatibility     Varicella      Past Surgical History:   Procedure Laterality Date     SECTION  1997    CYSTECTOMY Right     Ovarian    INDUCED       Surgical Treatment Of Spontaneous     TOOTH EXTRACTION Bilateral     TUBAL LIGATION      Midline     Current Outpatient Medications   Medication Sig Dispense Refill    acetaminophen (TYLENOL) 500 mg tablet Take 500 mg by mouth every 6 (six) hours as needed for mild pain      cholecalciferol (VITAMIN D3) 1,000 units tablet Take 1 tablet by mouth daily       No current facility-administered medications for this visit  No Known Allergies    Review of Systems   Constitutional: Positive for fatigue  Negative for chills and fever  HENT: Positive for congestion  Respiratory: Positive for cough  Negative for chest tightness and shortness of breath  Gastrointestinal: Negative for diarrhea, nausea and vomiting  Musculoskeletal: Positive for myalgias (improved)  Objective:    Vitals:    09/27/21 1117   Pulse: 78   SpO2: 98%       Physical Exam    VIRTUAL VISIT [address], [address] verbally agrees to participate in [facility]  Pt is aware that [facility] could be limited without vital signs or the ability to perform a full hands-on physical Ed Brown understands she or the provider may request at any time to terminate the video visit and request the patient to seek care or treatment in person

## 2021-09-29 ENCOUNTER — TELEMEDICINE (OUTPATIENT)
Dept: FAMILY MEDICINE CLINIC | Facility: CLINIC | Age: 51
End: 2021-09-29
Payer: COMMERCIAL

## 2021-09-29 VITALS — HEART RATE: 108 BPM | OXYGEN SATURATION: 95 %

## 2021-09-29 DIAGNOSIS — U07.1 COVID-19: Primary | ICD-10-CM

## 2021-09-29 PROCEDURE — 99213 OFFICE O/P EST LOW 20 MIN: CPT | Performed by: FAMILY MEDICINE

## 2021-09-29 NOTE — PROGRESS NOTES
COVID-19 Outpatient Progress Note    Assessment/Plan:    Problem List Items Addressed This Visit        Other    COVID-19 - Primary     Day 11  Cont vitamins  Follow up Monday  Will send me vitals via my chart              Disposition:     I have spent 10 minutes directly with the patient  Verification of patient location:    Patient is located in the following state in which I hold an active license [place]    Encounter provider Leigh Buck,     Provider located at [address] [address]    Recent Visits  Date Type Provider Dept   09/27/21 [address] [place] [address] Street, [address] [facility] [address]   09/23/21 [address] [place] [address] Street, [address] [facility] [address]   09/22/21 [address] [place] [address], DO [facility]   Showing recent visits within past 7 days and meeting all other requirements  Today's Visits  Date Type Provider Dept   09/29/21 Telemedicine Leigh Buck DO [facility]   Showing today's visits and meeting all other requirements  Future Appointments  No visits were found meeting these conditions  Showing future appointments within next 150 days and meeting all other requirements       Patient agrees to participate in a virtual check in via telephone or video visit instead of presenting to the office to address urgent/immediate medical needs  Patient is aware this is a billable service  After connecting through [facility], the patient was identified by name and date of birth  Negro Curry was informed that this was a telemedicine visit and that the exam was being conducted confidentially over secure lines  My office door was closed  No one else was in the room  Negro Curry acknowledged consent and understanding of privacy and security of the telemedicine visit  I informed the patient that I have reviewed her record in Epic and presented the opportunity for her to ask any questions regarding the visit today   The patient agreed to participate  Subjective:   Liz Schneider is a 45 y o  female who has been screened for COVID-19  Symptom change since last report: improving  Patient's symptoms include fatigue (slightly better), nasal congestion and cough  Patient denies fever, chills, shortness of breath, chest tightness, nausea, vomiting, diarrhea and headaches  Date of symptom onset: 2021  Date of exposure: 2021  Date of positive COVID-19 PCR: 2021  COVID-19 vaccination status: Not vaccinated    Philipp Calvin has been staying home and has isolated themselves in her home  She is taking care to not share personal items and is cleaning all surfaces that are touched often, like counters, tabletops, and doorknobs using household cleaning sprays or wipes  She is wearing a mask when she leaves her room  Slightly improvement  Doing little housework  Pulse ox 95 with exertion  Pulse ox 98 at rest    Lab Results   Component Value Date    SARSCOV2 Positive (A) 2021     Past Medical History:   Diagnosis Date    Abnormal blood chemistry     Acute bilateral low back pain without sciatica 2020    Chest pain     HX OF CHEST PAIN STRESS     H/O  section     hx of  delivery    Hyperlipidemia     Migraine     Miscarriage     Rh incompatibility     Varicella      Past Surgical History:   Procedure Laterality Date     SECTION  1997    CYSTECTOMY Right     Ovarian    INDUCED       Surgical Treatment Of Spontaneous     TOOTH EXTRACTION Bilateral     TUBAL LIGATION      Midline     Current Outpatient Medications   Medication Sig Dispense Refill    acetaminophen (TYLENOL) 500 mg tablet Take 500 mg by mouth every 6 (six) hours as needed for mild pain      cholecalciferol (VITAMIN D3) 1,000 units tablet Take 1 tablet by mouth daily       No current facility-administered medications for this visit       No Known Allergies    Review of Systems   Constitutional: Positive for fatigue (slightly better)  Negative for chills and fever  HENT: Positive for congestion  Eyes: Negative  Respiratory: Positive for cough  Negative for chest tightness and shortness of breath  Cardiovascular: Negative  Gastrointestinal: Negative for diarrhea, nausea and vomiting  Endocrine: Negative  Genitourinary: Negative  Musculoskeletal: Negative  Skin: Negative  Neurological: Negative for headaches  Hematological: Negative  Psychiatric/Behavioral: Negative  Objective:    Vitals:    09/29/21 1059   Pulse: (!) 108   SpO2: 95%       Physical Exam  Vitals reviewed  Constitutional:       Appearance: Normal appearance  HENT:      Head: Normocephalic and atraumatic  Eyes:      Extraocular Movements: Extraocular movements intact  Pupils: Pupils are equal, round, and reactive to light  Pulmonary:      Effort: Pulmonary effort is normal  No respiratory distress  Breath sounds: No wheezing  Neurological:      General: No focal deficit present  Mental Status: She is alert and oriented to person, place, and time  Psychiatric:         Mood and Affect: Mood normal          Behavior: Behavior normal          Thought Content: Thought content normal          Judgment: Judgment normal          VIRTUAL VISIT DISCLAIMER    Queenie Falcon verbally agrees to participate in [employer]  Pt is aware that [employer] could be limited without vital signs or the ability to perform a full hands-on physical Linda Sleet understands she or the provider may request at any time to terminate the video visit and request the patient to seek care or treatment in person

## 2021-10-04 ENCOUNTER — TELEMEDICINE (OUTPATIENT)
Dept: FAMILY MEDICINE CLINIC | Facility: CLINIC | Age: 51
End: 2021-10-04
Payer: COMMERCIAL

## 2021-10-04 VITALS — OXYGEN SATURATION: 96 % | HEART RATE: 98 BPM

## 2021-10-04 DIAGNOSIS — U07.1 COVID-19: Primary | ICD-10-CM

## 2021-10-04 PROCEDURE — 99213 OFFICE O/P EST LOW 20 MIN: CPT | Performed by: FAMILY MEDICINE

## 2021-10-13 ENCOUNTER — OFFICE VISIT (OUTPATIENT)
Dept: FAMILY MEDICINE CLINIC | Facility: CLINIC | Age: 51
End: 2021-10-13
Payer: COMMERCIAL

## 2021-10-13 VITALS
TEMPERATURE: 98.2 F | OXYGEN SATURATION: 99 % | DIASTOLIC BLOOD PRESSURE: 70 MMHG | SYSTOLIC BLOOD PRESSURE: 100 MMHG | HEART RATE: 95 BPM | HEIGHT: 62 IN | WEIGHT: 147.6 LBS | BODY MASS INDEX: 27.16 KG/M2 | RESPIRATION RATE: 14 BRPM

## 2021-10-13 DIAGNOSIS — U07.1 COVID-19: Primary | ICD-10-CM

## 2021-10-13 PROCEDURE — 3008F BODY MASS INDEX DOCD: CPT | Performed by: FAMILY MEDICINE

## 2021-10-13 PROCEDURE — 1036F TOBACCO NON-USER: CPT | Performed by: FAMILY MEDICINE

## 2021-10-13 PROCEDURE — 99213 OFFICE O/P EST LOW 20 MIN: CPT | Performed by: FAMILY MEDICINE

## 2021-10-13 PROCEDURE — 3725F SCREEN DEPRESSION PERFORMED: CPT | Performed by: FAMILY MEDICINE

## 2021-10-13 RX ORDER — MULTIVIT WITH MINERALS/LUTEIN
1000 TABLET ORAL DAILY
COMMUNITY

## 2021-10-13 RX ORDER — B-COMPLEX WITH VITAMIN C
TABLET ORAL
COMMUNITY
End: 2022-07-06

## 2021-10-21 ENCOUNTER — HOSPITAL ENCOUNTER (OUTPATIENT)
Dept: MAMMOGRAPHY | Facility: CLINIC | Age: 51
Discharge: HOME | End: 2021-10-21
Payer: COMMERCIAL

## 2021-10-21 VITALS — BODY MASS INDEX: 27.75 KG/M2 | WEIGHT: 147 LBS | HEIGHT: 61 IN

## 2021-10-21 DIAGNOSIS — N63.10 BREAST MASS, RIGHT: ICD-10-CM

## 2021-10-21 PROCEDURE — G0279 TOMOSYNTHESIS, MAMMO: HCPCS

## 2021-10-21 PROCEDURE — 77066 DX MAMMO INCL CAD BI: CPT

## 2022-01-04 ENCOUNTER — RA CDI HCC (OUTPATIENT)
Dept: OTHER | Facility: HOSPITAL | Age: 52
End: 2022-01-04

## 2022-01-04 NOTE — PROGRESS NOTES
[facility]  coding opportunities       Chart reviewed, no opportunity found: CHART REVIEWED, NO OPPORTUNITY FOUND                        Patients insurance company: [employer] (Medicare and Commercial for [employer] and [employer])

## 2022-01-11 ENCOUNTER — OFFICE VISIT (OUTPATIENT)
Dept: FAMILY MEDICINE CLINIC | Facility: CLINIC | Age: 52
End: 2022-01-11
Payer: COMMERCIAL

## 2022-01-11 VITALS
BODY MASS INDEX: 27.02 KG/M2 | RESPIRATION RATE: 14 BRPM | HEART RATE: 81 BPM | OXYGEN SATURATION: 99 % | TEMPERATURE: 97.6 F | DIASTOLIC BLOOD PRESSURE: 86 MMHG | SYSTOLIC BLOOD PRESSURE: 120 MMHG | WEIGHT: 146.8 LBS | HEIGHT: 62 IN

## 2022-01-11 DIAGNOSIS — Z00.00 ANNUAL PHYSICAL EXAM: Primary | ICD-10-CM

## 2022-01-11 DIAGNOSIS — E78.5 MILD HYPERLIPIDEMIA: ICD-10-CM

## 2022-01-11 DIAGNOSIS — E55.9 VITAMIN D DEFICIENCY: ICD-10-CM

## 2022-01-11 PROBLEM — Z86.16 HISTORY OF COVID-19: Status: ACTIVE | Noted: 2021-09-22

## 2022-01-11 PROCEDURE — 1036F TOBACCO NON-USER: CPT | Performed by: FAMILY MEDICINE

## 2022-01-11 PROCEDURE — 3008F BODY MASS INDEX DOCD: CPT | Performed by: FAMILY MEDICINE

## 2022-01-11 PROCEDURE — 99396 PREV VISIT EST AGE 40-64: CPT | Performed by: FAMILY MEDICINE

## 2022-01-11 NOTE — PATIENT INSTRUCTIONS

## 2022-01-11 NOTE — PROGRESS NOTES
[facility]    NAME: Keven Bass  AGE: 45 y o  SEX: female  : 1970     DATE: 2022     Assessment and Plan:     Problem List Items Addressed This Visit        Other    Mild hyperlipidemia    Relevant Orders    Comprehensive metabolic panel    Lipid Panel with Direct LDL reflex    TSH, 3rd generation with Free T4 reflex    Vitamin D deficiency    Relevant Orders    Vitamin D 25 hydroxy    Annual physical exam - Primary     Had covid vaccine  Had flu shot-at [employer]               Immunizations and preventive care screenings were discussed with patient today  Appropriate education was printed on patient's after visit summary  Counseling:  · Dental Health: discussed importance of regular tooth brushing, flossing, and dental visits  BMI Counseling: Body mass index is 27 26 kg/m²  The BMI is above normal  Nutrition recommendations include encouraging healthy choices of fruits and vegetables  Exercise recommendations include exercising 3-5 times per week  No pharmacotherapy was ordered  Rationale for BMI follow-up plan is due to patient being overweight or obese  Return in 1 year (on 2023)  Chief Complaint:     Chief Complaint   Patient presents with    Annual Exam     no concerns      History of Present Illness:     Adult Annual Physical   Patient here for a comprehensive physical exam  The patient reports no problems  Diet and Physical Activity  · Diet/Nutrition: well balanced diet  · Exercise: 3-4 times a week on average  Depression Screening  PHQ-2/9 Depression Screening         General Health  · Sleep: sleeps poorly  · Hearing: normal - bilateral   · Vision: goes for regular eye exams  · Dental: brushes teeth twice daily  /GYN Health  · Patient is: perimenopausal  · Last menstrual period: irregular  · Contraceptive method: n/a       Review of Systems:     Review of Systems   Past Medical History:     Past Medical History:   Diagnosis Date    Abnormal blood chemistry     Acute bilateral low back pain without sciatica 2020    Chest pain     HX OF CHEST PAIN STRESS     H/O  section     hx of  delivery    Hyperlipidemia     Migraine     Miscarriage     Rh incompatibility     Varicella       Past Surgical History:     Past Surgical History:   Procedure Laterality Date     SECTION  1997    CYSTECTOMY Right     Ovarian    INDUCED       Surgical Treatment Of Spontaneous     TOOTH EXTRACTION Bilateral     TUBAL LIGATION      Midline      Social History:     Social History     Socioeconomic History    Marital status: /Civil Union     Spouse name: None    Number of children: None    Years of education: None    Highest education level: None   Occupational History    None   Tobacco Use    Smoking status: Never Smoker    Smokeless tobacco: Never Used   Vaping Use    Vaping Use: Never used   Substance and Sexual Activity    Alcohol use:  Yes     Alcohol/week: 0 0 standard drinks    Drug use: Never    Sexual activity: Yes     Partners: Male   Other Topics Concern    None   Social History Narrative    None     Social Determinants of Health     Financial Resource Strain: Not on file   Food Insecurity: Not on file   Transportation Needs: Not on file   Physical Activity: Not on file   Stress: Not on file   Social Connections: Not on file   Intimate Partner Violence: Not on file   Housing Stability: Not on file      Family History:     Family History   Problem Relation Age of Onset    Coronary artery disease Mother     Hypothyroidism Mother     Hyperlipidemia Mother         Pure   Yemi Polio Migraines Mother     Osteoporosis Mother     Thyroid disease Mother    Yemi Polio Cancer Father     Diabetes Father     Colon cancer Father 79    Diabetes Maternal Grandmother     Diabetes Paternal Grandmother     Stroke Paternal Grandmother     No Known Problems Maternal Aunt     Heart attack Maternal Grandfather     No Known Problems Paternal Grandfather     No Known Problems Son     No Known Problems Son       Current Medications:     Current Outpatient Medications   Medication Sig Dispense Refill    Ascorbic Acid (vitamin C) 1000 MG tablet Take 1,000 mg by mouth daily      cholecalciferol (VITAMIN D3) 1,000 units tablet Take 1 tablet by mouth daily      Zinc 100 MG TABS Take by mouth       No current facility-administered medications for this visit  Allergies:     No Known Allergies   Physical Exam:     /86 (BP Location: Left arm, Patient Position: Sitting, Cuff Size: Standard)   Pulse 81   Temp 97 6 °F (36 4 °C) (Temporal)   Resp 14   Ht 5' 1 54" (1 563 m)   Wt 66 6 kg (146 lb 12 8 oz)   SpO2 99%   BMI 27 26 kg/m²     Physical Exam  Vitals and nursing note reviewed  Constitutional:       Appearance: Normal appearance  She is well-developed  HENT:      Head: Normocephalic and atraumatic  Right Ear: Tympanic membrane, ear canal and external ear normal       Left Ear: Tympanic membrane, ear canal and external ear normal       Nose: Nose normal    Eyes:      Extraocular Movements: Extraocular movements intact  Conjunctiva/sclera: Conjunctivae normal       Pupils: Pupils are equal, round, and reactive to light  Cardiovascular:      Rate and Rhythm: Normal rate and regular rhythm  Heart sounds: Normal heart sounds  Pulmonary:      Effort: Pulmonary effort is normal       Breath sounds: Normal breath sounds  Abdominal:      General: Abdomen is flat  Bowel sounds are normal       Palpations: Abdomen is soft  Musculoskeletal:         General: Normal range of motion  Cervical back: Normal range of motion and neck supple  Skin:     General: Skin is warm and dry  Capillary Refill: Capillary refill takes less than 2 seconds  Neurological:      General: No focal deficit present  Mental Status: She is alert and oriented to person, place, and time  Psychiatric:         Mood and Affect: Mood normal          Behavior: Behavior normal          Thought Content:  Thought content normal          Judgment: Judgment normal           Bertha Koroma DO  [facility]

## 2022-02-25 LAB
25(OH)D3 SERPL-MCNC: 49 NG/ML (ref 30–100)
ALBUMIN SERPL-MCNC: 4.5 G/DL (ref 3.6–5.1)
ALBUMIN/GLOB SERPL: 1.7 (CALC) (ref 1–2.5)
ALP SERPL-CCNC: 69 U/L (ref 37–153)
ALT SERPL-CCNC: 88 U/L (ref 6–29)
AST SERPL-CCNC: 45 U/L (ref 10–35)
BILIRUB SERPL-MCNC: 0.4 MG/DL (ref 0.2–1.2)
BUN SERPL-MCNC: 18 MG/DL (ref 7–25)
BUN/CREAT SERPL: ABNORMAL (CALC) (ref 6–22)
CALCIUM SERPL-MCNC: 9.3 MG/DL (ref 8.6–10.4)
CHLORIDE SERPL-SCNC: 105 MMOL/L (ref 98–110)
CHOLEST SERPL-MCNC: 326 MG/DL
CHOLEST/HDLC SERPL: 4.9 (CALC)
CO2 SERPL-SCNC: 29 MMOL/L (ref 20–32)
CREAT SERPL-MCNC: 0.78 MG/DL (ref 0.5–1.05)
GLOBULIN SER CALC-MCNC: 2.7 G/DL (CALC) (ref 1.9–3.7)
GLUCOSE SERPL-MCNC: 82 MG/DL (ref 65–99)
HDLC SERPL-MCNC: 66 MG/DL
LDLC SERPL CALC-MCNC: 228 MG/DL (CALC)
NONHDLC SERPL-MCNC: 260 MG/DL (CALC)
POTASSIUM SERPL-SCNC: 4.2 MMOL/L (ref 3.5–5.3)
PROT SERPL-MCNC: 7.2 G/DL (ref 6.1–8.1)
SL AMB EGFR AFRICAN AMERICAN: 101 ML/MIN/1.73M2
SL AMB EGFR NON AFRICAN AMERICAN: 87 ML/MIN/1.73M2
SODIUM SERPL-SCNC: 140 MMOL/L (ref 135–146)
TRIGL SERPL-MCNC: 159 MG/DL
TSH SERPL-ACNC: 1.95 MIU/L

## 2022-03-01 ENCOUNTER — OFFICE VISIT (OUTPATIENT)
Dept: FAMILY MEDICINE CLINIC | Facility: CLINIC | Age: 52
End: 2022-03-01
Payer: COMMERCIAL

## 2022-03-01 VITALS
HEART RATE: 89 BPM | TEMPERATURE: 97.8 F | WEIGHT: 141 LBS | RESPIRATION RATE: 14 BRPM | BODY MASS INDEX: 25.95 KG/M2 | DIASTOLIC BLOOD PRESSURE: 80 MMHG | HEIGHT: 62 IN | OXYGEN SATURATION: 99 % | SYSTOLIC BLOOD PRESSURE: 110 MMHG

## 2022-03-01 DIAGNOSIS — R53.83 FATIGUE, UNSPECIFIED TYPE: ICD-10-CM

## 2022-03-01 DIAGNOSIS — R74.8 ELEVATED LIVER ENZYMES: ICD-10-CM

## 2022-03-01 DIAGNOSIS — E78.5 MILD HYPERLIPIDEMIA: Primary | ICD-10-CM

## 2022-03-01 PROCEDURE — 3725F SCREEN DEPRESSION PERFORMED: CPT | Performed by: FAMILY MEDICINE

## 2022-03-01 PROCEDURE — 1036F TOBACCO NON-USER: CPT | Performed by: FAMILY MEDICINE

## 2022-03-01 PROCEDURE — 99214 OFFICE O/P EST MOD 30 MIN: CPT | Performed by: FAMILY MEDICINE

## 2022-03-01 PROCEDURE — 3008F BODY MASS INDEX DOCD: CPT | Performed by: FAMILY MEDICINE

## 2022-03-01 RX ORDER — ROSUVASTATIN CALCIUM 10 MG/1
10 TABLET, COATED ORAL DAILY
Qty: 30 TABLET | Refills: 5 | Status: SHIPPED | OUTPATIENT
Start: 2022-03-01 | End: 2022-07-06 | Stop reason: SDUPTHER

## 2022-03-01 NOTE — PROGRESS NOTES
Assessment/Plan:    1  Mild hyperlipidemia  -     rosuvastatin (CRESTOR) 10 MG tablet; Take 1 tablet (10 mg total) by mouth daily  -     Lipid Panel with Direct LDL reflex; Future; Expected date: 07/01/2022  -     Lipid Panel with Direct LDL reflex    2  Elevated liver enzymes  Assessment & Plan:  Possible fatty liver  Check US    Orders:  -     US abdomen complete; Future; Expected date: 03/01/2022  -     Comprehensive metabolic panel; Future; Expected date: 07/01/2022  -     Comprehensive metabolic panel    3  Fatigue, unspecified type  -     CBC and differential; Future; Expected date: 03/01/2022  -     CBC and differential      Depression Screening and Follow-up Plan: Patient was screened for depression during today's encounter  They screened negative with a PHQ-2 score of 0  There are no Patient Instructions on file for this visit  Return in about 4 months (around 7/1/2022)  Subjective:      Patient ID: Pam Martino is a 45 y o  female  Chief Complaint   Patient presents with    Follow-up     Patient here to discuss abnormal lab results       Went for blood work  Labs reviewed  [employer] for labs bc she didn't feel great  Headache for 2 weeks  Felt lightheaded and dizzy  Felt terrible the whole week prior to getting labs  During intercourse was about to climax and had extreme head pain    Hyperlipidemia  This is a chronic problem  The current episode started more than 1 year ago  The problem is uncontrolled  Recent lipid tests were reviewed and are high  There are no known factors aggravating her hyperlipidemia  She is currently on no antihyperlipidemic treatment  There are no compliance problems  The following portions of the patient's history were reviewed and updated as appropriate: allergies, current medications, past family history, past medical history, past social history, past surgical history and problem list     Review of Systems   Constitutional: Positive for fatigue     HENT: Negative  Eyes: Negative  Respiratory: Negative  Cardiovascular: Negative  Gastrointestinal: Negative  Endocrine: Negative  Genitourinary: Negative  Post coital headache   Musculoskeletal: Negative  Neurological: Positive for light-headedness and headaches  Hematological: Negative  Psychiatric/Behavioral: Negative  Current Outpatient Medications   Medication Sig Dispense Refill    Ascorbic Acid (vitamin C) 1000 MG tablet Take 1,000 mg by mouth daily      cholecalciferol (VITAMIN D3) 1,000 units tablet Take 1 tablet by mouth daily      rosuvastatin (CRESTOR) 10 MG tablet Take 1 tablet (10 mg total) by mouth daily 30 tablet 5    Zinc 100 MG TABS Take by mouth       No current facility-administered medications for this visit  Objective:    /80 (BP Location: Left arm, Patient Position: Sitting, Cuff Size: Standard)   Pulse 89   Temp 97 8 °F (36 6 °C)   Resp 14   Ht 5' 1 54" (1 563 m)   Wt 64 kg (141 lb)   SpO2 99%   BMI 26 18 kg/m²        Physical Exam  Vitals and nursing note reviewed  Constitutional:       Appearance: Normal appearance  HENT:      Head: Normocephalic and atraumatic  Eyes:      Extraocular Movements: Extraocular movements intact  Pupils: Pupils are equal, round, and reactive to light  Cardiovascular:      Rate and Rhythm: Normal rate and regular rhythm  Pulses: Normal pulses  Heart sounds: Normal heart sounds  Pulmonary:      Effort: Pulmonary effort is normal       Breath sounds: Normal breath sounds  Abdominal:      General: Abdomen is flat  Palpations: Abdomen is soft  Musculoskeletal:      Cervical back: Normal range of motion and neck supple  Skin:     Capillary Refill: Capillary refill takes less than 2 seconds  Neurological:      General: No focal deficit present  Mental Status: She is alert and oriented to person, place, and time     Psychiatric:         Mood and Affect: Mood normal  Behavior: Behavior normal          Thought Content:  Thought content normal          Judgment: Judgment normal                 Ashly Andrade, DO

## 2022-03-02 LAB
BASOPHILS # BLD AUTO: 37 CELLS/UL (ref 0–200)
BASOPHILS NFR BLD AUTO: 0.6 %
EOSINOPHIL # BLD AUTO: 81 CELLS/UL (ref 15–500)
EOSINOPHIL NFR BLD AUTO: 1.3 %
ERYTHROCYTE [DISTWIDTH] IN BLOOD BY AUTOMATED COUNT: 12.4 % (ref 11–15)
HCT VFR BLD AUTO: 37.3 % (ref 35–45)
HGB BLD-MCNC: 13.2 G/DL (ref 11.7–15.5)
LYMPHOCYTES # BLD AUTO: 1488 CELLS/UL (ref 850–3900)
LYMPHOCYTES NFR BLD AUTO: 24 %
MCH RBC QN AUTO: 29.1 PG (ref 27–33)
MCHC RBC AUTO-ENTMCNC: 35.4 G/DL (ref 32–36)
MCV RBC AUTO: 82.2 FL (ref 80–100)
MONOCYTES # BLD AUTO: 465 CELLS/UL (ref 200–950)
MONOCYTES NFR BLD AUTO: 7.5 %
NEUTROPHILS # BLD AUTO: 4129 CELLS/UL (ref 1500–7800)
NEUTROPHILS NFR BLD AUTO: 66.6 %
PLATELET # BLD AUTO: 256 THOUSAND/UL (ref 140–400)
PMV BLD REES-ECKER: 10.3 FL (ref 7.5–12.5)
RBC # BLD AUTO: 4.54 MILLION/UL (ref 3.8–5.1)
WBC # BLD AUTO: 6.2 THOUSAND/UL (ref 3.8–10.8)

## 2022-03-22 ENCOUNTER — HOSPITAL ENCOUNTER (OUTPATIENT)
Dept: RADIOLOGY | Age: 52
Discharge: HOME | End: 2022-03-22
Payer: COMMERCIAL

## 2022-03-22 DIAGNOSIS — R74.8 ELEVATED LIVER ENZYMES: ICD-10-CM

## 2022-03-22 PROCEDURE — 76700 US EXAM ABDOM COMPLETE: CPT

## 2022-07-01 LAB
ALBUMIN SERPL-MCNC: 4.6 G/DL (ref 3.6–5.1)
ALBUMIN/GLOB SERPL: 1.9 (CALC) (ref 1–2.5)
ALP SERPL-CCNC: 58 U/L (ref 37–153)
ALT SERPL-CCNC: 33 U/L (ref 6–29)
AST SERPL-CCNC: 24 U/L (ref 10–35)
BILIRUB SERPL-MCNC: 0.4 MG/DL (ref 0.2–1.2)
BUN SERPL-MCNC: 17 MG/DL (ref 7–25)
BUN/CREAT SERPL: ABNORMAL (CALC) (ref 6–22)
CALCIUM SERPL-MCNC: 9.4 MG/DL (ref 8.6–10.4)
CHLORIDE SERPL-SCNC: 105 MMOL/L (ref 98–110)
CHOLEST SERPL-MCNC: 196 MG/DL
CHOLEST/HDLC SERPL: 2.5 (CALC)
CO2 SERPL-SCNC: 29 MMOL/L (ref 20–32)
CREAT SERPL-MCNC: 0.83 MG/DL (ref 0.5–1.05)
GLOBULIN SER CALC-MCNC: 2.4 G/DL (CALC) (ref 1.9–3.7)
GLUCOSE SERPL-MCNC: 86 MG/DL (ref 65–99)
HDLC SERPL-MCNC: 79 MG/DL
LDLC SERPL CALC-MCNC: 97 MG/DL (CALC)
NONHDLC SERPL-MCNC: 117 MG/DL (CALC)
POTASSIUM SERPL-SCNC: 4.1 MMOL/L (ref 3.5–5.3)
PROT SERPL-MCNC: 7 G/DL (ref 6.1–8.1)
SL AMB EGFR AFRICAN AMERICAN: 93 ML/MIN/1.73M2
SL AMB EGFR NON AFRICAN AMERICAN: 81 ML/MIN/1.73M2
SODIUM SERPL-SCNC: 141 MMOL/L (ref 135–146)
TRIGL SERPL-MCNC: 108 MG/DL

## 2022-07-06 ENCOUNTER — OFFICE VISIT (OUTPATIENT)
Dept: FAMILY MEDICINE CLINIC | Facility: CLINIC | Age: 52
End: 2022-07-06
Payer: COMMERCIAL

## 2022-07-06 VITALS
DIASTOLIC BLOOD PRESSURE: 80 MMHG | HEART RATE: 80 BPM | WEIGHT: 136.8 LBS | OXYGEN SATURATION: 98 % | BODY MASS INDEX: 25.17 KG/M2 | HEIGHT: 62 IN | RESPIRATION RATE: 13 BRPM | TEMPERATURE: 97.7 F | SYSTOLIC BLOOD PRESSURE: 110 MMHG

## 2022-07-06 DIAGNOSIS — R74.8 ELEVATED LIVER ENZYMES: ICD-10-CM

## 2022-07-06 DIAGNOSIS — E55.9 VITAMIN D DEFICIENCY: ICD-10-CM

## 2022-07-06 DIAGNOSIS — N92.6 IRREGULAR MENSES: ICD-10-CM

## 2022-07-06 DIAGNOSIS — E78.5 MILD HYPERLIPIDEMIA: Primary | ICD-10-CM

## 2022-07-06 PROBLEM — R53.83 FATIGUE: Status: RESOLVED | Noted: 2022-03-01 | Resolved: 2022-07-06

## 2022-07-06 PROCEDURE — 99214 OFFICE O/P EST MOD 30 MIN: CPT | Performed by: FAMILY MEDICINE

## 2022-07-06 RX ORDER — ROSUVASTATIN CALCIUM 10 MG/1
10 TABLET, COATED ORAL DAILY
Qty: 90 TABLET | Refills: 3 | Status: SHIPPED | OUTPATIENT
Start: 2022-07-06

## 2022-07-06 NOTE — PROGRESS NOTES
Assessment/Plan:    1  Mild hyperlipidemia  Assessment & Plan:  Labs are improved  Cont crestor    Orders:  -     CBC; Future; Expected date: 01/01/2023  -     Lipid Panel with Direct LDL reflex; Future; Expected date: 01/01/2023  -     TSH, 3rd generation with Free T4 reflex; Future; Expected date: 01/01/2023  -     CBC  -     Lipid Panel with Direct LDL reflex  -     TSH, 3rd generation with Free T4 reflex  -     rosuvastatin (CRESTOR) 10 MG tablet; Take 1 tablet (10 mg total) by mouth daily    2  Elevated liver enzymes  Assessment & Plan:  Improved with diet and crestor    Orders:  -     Comprehensive metabolic panel; Future; Expected date: 01/01/2023  -     Comprehensive metabolic panel    3  Vitamin D deficiency  Assessment & Plan:  Recheck at next set of labs    Orders:  -     Vitamin D 25 hydroxy; Future; Expected date: 01/01/2023  -     Vitamin D 25 hydroxy    4  Irregular menses  Assessment & Plan:  3 months now without period  Perimenopausal with hot flashes              There are no Patient Instructions on file for this visit  No follow-ups on file  Subjective:      Patient ID: Vasyl Galan is a 47 y o  female  Chief Complaint   Patient presents with    Follow-up     Patient here for follow up        Here for follow up  Decreased red meat and cheeses  Increased veggies  Eats oatmeal every morning  Watching diet  Ran a 5K  Labs reviewed  Has lost 10 pound    Hyperlipidemia  This is a chronic problem  The current episode started more than 1 year ago  The problem is controlled  Recent lipid tests were reviewed and are normal  There are no known factors aggravating her hyperlipidemia  Current antihyperlipidemic treatment includes statins  The current treatment provides significant improvement of lipids  There are no compliance problems          The following portions of the patient's history were reviewed and updated as appropriate: allergies, current medications, past family history, past medical history, past social history, past surgical history and problem list     Review of Systems   Constitutional: Negative  HENT: Negative  Eyes: Negative  Respiratory: Negative  Cardiovascular: Negative  Gastrointestinal: Negative  Endocrine: Negative  Genitourinary: Negative  Musculoskeletal: Negative  Allergic/Immunologic: Negative  Neurological: Negative  Hematological: Negative  Psychiatric/Behavioral: Negative  Current Outpatient Medications   Medication Sig Dispense Refill    Ascorbic Acid (vitamin C) 1000 MG tablet Take 1,000 mg by mouth daily      cholecalciferol (VITAMIN D3) 1,000 units tablet Take 1 tablet by mouth daily      Omega-3 Fatty Acids (FISH OIL PO) Take by mouth Taking 1250 mg      rosuvastatin (CRESTOR) 10 MG tablet Take 1 tablet (10 mg total) by mouth daily 90 tablet 3     No current facility-administered medications for this visit  Objective:    /80 (BP Location: Left arm, Patient Position: Sitting, Cuff Size: Standard)   Pulse 80   Temp 97 7 °F (36 5 °C) (Tympanic)   Resp 13   Ht 5' 1 54" (1 563 m)   Wt 62 1 kg (136 lb 12 8 oz)   SpO2 98%   BMI 25 40 kg/m²        Physical Exam  Vitals and nursing note reviewed  Constitutional:       Appearance: Normal appearance  HENT:      Head: Normocephalic and atraumatic  Eyes:      Extraocular Movements: Extraocular movements intact  Pupils: Pupils are equal, round, and reactive to light  Cardiovascular:      Rate and Rhythm: Normal rate and regular rhythm  Pulses: Normal pulses  Heart sounds: Normal heart sounds  Pulmonary:      Effort: Pulmonary effort is normal       Breath sounds: Normal breath sounds  Abdominal:      General: Abdomen is flat  Palpations: Abdomen is soft  Musculoskeletal:         General: Normal range of motion  Cervical back: Normal range of motion and neck supple  Skin:     General: Skin is warm        Capillary Refill: Capillary refill takes less than 2 seconds  Neurological:      General: No focal deficit present  Mental Status: She is alert and oriented to person, place, and time  Psychiatric:         Mood and Affect: Mood normal          Behavior: Behavior normal          Thought Content:  Thought content normal          Judgment: Judgment normal                 Sukhjinder Grady DO

## 2022-10-12 PROBLEM — Z01.419 ROUTINE GYNECOLOGICAL EXAMINATION: Status: RESOLVED | Noted: 2021-01-21 | Resolved: 2022-10-12

## 2022-10-12 PROBLEM — Z12.12 SCREENING FOR COLORECTAL CANCER: Status: RESOLVED | Noted: 2021-01-07 | Resolved: 2022-10-12

## 2022-10-12 PROBLEM — Z12.11 SCREENING FOR COLORECTAL CANCER: Status: RESOLVED | Noted: 2021-01-07 | Resolved: 2022-10-12

## 2023-01-16 ENCOUNTER — OFFICE VISIT (OUTPATIENT)
Dept: FAMILY MEDICINE CLINIC | Facility: CLINIC | Age: 53
End: 2023-01-16

## 2023-01-16 VITALS
RESPIRATION RATE: 16 BRPM | TEMPERATURE: 96.3 F | BODY MASS INDEX: 26.28 KG/M2 | OXYGEN SATURATION: 99 % | DIASTOLIC BLOOD PRESSURE: 88 MMHG | HEART RATE: 99 BPM | WEIGHT: 142.8 LBS | SYSTOLIC BLOOD PRESSURE: 128 MMHG | HEIGHT: 62 IN

## 2023-01-16 DIAGNOSIS — E78.5 MILD HYPERLIPIDEMIA: ICD-10-CM

## 2023-01-16 DIAGNOSIS — Z00.00 ANNUAL PHYSICAL EXAM: Primary | ICD-10-CM

## 2023-01-16 DIAGNOSIS — R92.8 ABNORMAL MAMMOGRAM OF BOTH BREASTS: ICD-10-CM

## 2023-01-16 NOTE — PROGRESS NOTES
[address] [facility]ACT[facility]    NAME: Norman Major  AGE: 47 y o  SEX: female  : 1970     DATE: 2023     Assessment and Plan:     Problem List Items Addressed This Visit        Other    Mild hyperlipidemia     Off crestor   Was having sciatic pain         Annual physical exam - Primary     Had flu shot and covid booster  rx for mammogram        Other Visit Diagnoses     Abnormal mammogram of both breasts        Relevant Orders    Mammo diagnostic bilateral w cad          Immunizations and preventive care screenings were discussed with patient today  Appropriate education was printed on patient's after visit summary  Counseling:  Dental Health: discussed importance of regular tooth brushing, flossing, and dental visits  BMI Counseling: Body mass index is 26 48 kg/m²  The BMI is above normal  Nutrition recommendations include encouraging healthy choices of fruits and vegetables  Exercise recommendations include exercising 3-5 times per week  No pharmacotherapy was ordered  Rationale for BMI follow-up plan is due to patient being overweight or obese  Return in 6 months (on 2023) for Recheck  Chief Complaint:     Chief Complaint   Patient presents with   • Physical Exam     Patient is being seen for an annual physical        History of Present Illness:     Adult Annual Physical   Patient here for a comprehensive physical exam  The patient reports problems - sciatic  Diet and Physical Activity  Diet/Nutrition: well balanced diet  Exercise: 3-4 times a week on average  Depression Screening  PHQ-2/9 Depression Screening         General Health  Sleep: sleeps well and taking zquil  Hearing: normal - bilateral   Vision: wears glasses  Dental: regular dental visits  /GYN Health  Patient is: perimenopausal  Last menstrual period: almost at 1 year-  Contraceptive method: n/a       Review of Systems: Review of Systems   Constitutional: Negative  HENT: Negative  Eyes: Negative  Respiratory: Negative  Cardiovascular: Negative  Gastrointestinal: Negative  Endocrine: Negative  Genitourinary: Negative  Musculoskeletal: Positive for back pain (sciatic pain)  Skin: Negative  Allergic/Immunologic: Negative  Neurological: Negative  Hematological: Negative  Psychiatric/Behavioral: Negative  Past Medical History:     Past Medical History:   Diagnosis Date   • Abnormal blood chemistry    • Acute bilateral low back pain without sciatica 2020   • Chest pain     HX OF CHEST PAIN STRESS    • H/O  section     hx of  delivery   • Hyperlipidemia    • Migraine    • Miscarriage    • Rh incompatibility    • Varicella       Past Surgical History:     Past Surgical History:   Procedure Laterality Date   •  SECTION  1997   • CYSTECTOMY Right     Ovarian   • INDUCED       Surgical Treatment Of Spontaneous    • TOOTH EXTRACTION Bilateral    • TUBAL LIGATION      Midline      Social History:     Social History     Socioeconomic History   • Marital status: /Civil Union     Spouse name: None   • Number of children: None   • Years of education: None   • Highest education level: None   Occupational History   • None   Tobacco Use   • Smoking status: Never   • Smokeless tobacco: Never   Vaping Use   • Vaping Use: Never used   Substance and Sexual Activity   • Alcohol use:  Yes     Alcohol/week: 0 0 standard drinks   • Drug use: Never   • Sexual activity: Yes     Partners: Male   Other Topics Concern   • None   Social History Narrative   • None     Social Determinants of Health     Financial Resource Strain: Not on file   Food Insecurity: Not on file   Transportation Needs: Not on file   Physical Activity: Not on file   Stress: Not on file   Social Connections: Not on file   Intimate Partner Violence: Not on file   Housing Stability: Not on file      Family History:     Family History   Problem Relation Age of Onset   • Coronary artery disease Mother    • Hypothyroidism Mother    • Hyperlipidemia Mother         [de-identified]   • Migraines Mother    • Osteoporosis Mother    • Thyroid disease Mother    • Cancer Father    • Diabetes Father    • Colon cancer Father 79   • Diabetes Maternal Grandmother    • Diabetes Paternal Grandmother    • Stroke Paternal Grandmother    • No Known Problems Maternal Aunt    • Heart attack Maternal Grandfather    • No Known Problems Paternal Grandfather    • No Known Problems Son    • No Known Problems Son       Current Medications:     Current Outpatient Medications   Medication Sig Dispense Refill   • Ascorbic Acid (vitamin C) 1000 MG tablet Take 1,000 mg by mouth daily     • cholecalciferol (VITAMIN D3) 1,000 units tablet Take 1 tablet by mouth daily     • Omega-3 Fatty Acids (FISH OIL PO) Take by mouth Taking 1250 mg     • rosuvastatin (CRESTOR) 10 MG tablet Take 1 tablet (10 mg total) by mouth daily 90 tablet 3     No current facility-administered medications for this visit  Allergies:     No Known Allergies   Physical Exam:     /88 (BP Location: Left arm, Patient Position: Sitting, Cuff Size: Adult)   Pulse 99   Temp (!) 96 3 °F (35 7 °C) (Tympanic)   Resp 16   Ht 5' 1 58" (1 564 m)   Wt 64 8 kg (142 lb 12 8 oz)   SpO2 99%   BMI 26 48 kg/m²     Physical Exam  Vitals and nursing note reviewed  Constitutional:       Appearance: Normal appearance  She is well-developed  HENT:      Head: Normocephalic and atraumatic  Right Ear: Tympanic membrane, ear canal and external ear normal       Left Ear: Tympanic membrane, ear canal and external ear normal       Nose: Nose normal    Eyes:      Extraocular Movements: Extraocular movements intact  Conjunctiva/sclera: Conjunctivae normal       Pupils: Pupils are equal, round, and reactive to light     Cardiovascular:      Rate and Rhythm: Normal rate and regular rhythm  Heart sounds: Normal heart sounds  Pulmonary:      Effort: Pulmonary effort is normal       Breath sounds: Normal breath sounds  Abdominal:      General: Abdomen is flat  Bowel sounds are normal       Palpations: Abdomen is soft  Musculoskeletal:         General: Normal range of motion  Cervical back: Normal range of motion and neck supple  Skin:     General: Skin is warm and dry  Capillary Refill: Capillary refill takes less than 2 seconds  Neurological:      General: No focal deficit present  Mental Status: She is alert and oriented to person, place, and time  Psychiatric:         Mood and Affect: Mood normal          Behavior: Behavior normal          Thought Content:  Thought content normal          Judgment: Judgment normal           Tobi Morris DO  [facility]

## 2023-01-16 NOTE — PATIENT INSTRUCTIONS

## 2023-07-10 ENCOUNTER — RA CDI HCC (OUTPATIENT)
Dept: OTHER | Facility: HOSPITAL | Age: 53
End: 2023-07-10

## 2023-07-10 NOTE — PROGRESS NOTES
[address] [facility] coding opportunities       Chart reviewed, no opportunity found: CHART REVIEWED, NO OPPORTUNITY FOUND        Patients Insurance        Commercial Insurance: [address]

## 2023-07-28 LAB
25(OH)D3 SERPL-MCNC: 51 NG/ML (ref 30–100)
ALBUMIN SERPL-MCNC: 4.4 G/DL (ref 3.6–5.1)
ALBUMIN/GLOB SERPL: 1.8 (CALC) (ref 1–2.5)
ALP SERPL-CCNC: 63 U/L (ref 37–153)
ALT SERPL-CCNC: 31 U/L (ref 6–29)
AST SERPL-CCNC: 22 U/L (ref 10–35)
BILIRUB SERPL-MCNC: 0.4 MG/DL (ref 0.2–1.2)
BUN SERPL-MCNC: 23 MG/DL (ref 7–25)
BUN/CREAT SERPL: ABNORMAL (CALC) (ref 6–22)
CALCIUM SERPL-MCNC: 9.5 MG/DL (ref 8.6–10.4)
CHLORIDE SERPL-SCNC: 107 MMOL/L (ref 98–110)
CHOLEST SERPL-MCNC: 244 MG/DL
CHOLEST/HDLC SERPL: 2.7 (CALC)
CO2 SERPL-SCNC: 28 MMOL/L (ref 20–32)
CREAT SERPL-MCNC: 0.99 MG/DL (ref 0.5–1.03)
ERYTHROCYTE [DISTWIDTH] IN BLOOD BY AUTOMATED COUNT: 11.4 % (ref 11–15)
GFR/BSA.PRED SERPLBLD CYS-BASED-ARV: 68 ML/MIN/1.73M2
GLOBULIN SER CALC-MCNC: 2.5 G/DL (CALC) (ref 1.9–3.7)
GLUCOSE SERPL-MCNC: 88 MG/DL (ref 65–99)
HCT VFR BLD AUTO: 39.1 % (ref 35–45)
HDLC SERPL-MCNC: 89 MG/DL
HGB BLD-MCNC: 13.6 G/DL (ref 11.7–15.5)
LDLC SERPL CALC-MCNC: 138 MG/DL (CALC)
MCH RBC QN AUTO: 28.9 PG (ref 27–33)
MCHC RBC AUTO-ENTMCNC: 34.8 G/DL (ref 32–36)
MCV RBC AUTO: 83.2 FL (ref 80–100)
NONHDLC SERPL-MCNC: 155 MG/DL (CALC)
PLATELET # BLD AUTO: 224 THOUSAND/UL (ref 140–400)
PMV BLD REES-ECKER: 10.5 FL (ref 7.5–12.5)
POTASSIUM SERPL-SCNC: 4.3 MMOL/L (ref 3.5–5.3)
PROT SERPL-MCNC: 6.9 G/DL (ref 6.1–8.1)
RBC # BLD AUTO: 4.7 MILLION/UL (ref 3.8–5.1)
SODIUM SERPL-SCNC: 143 MMOL/L (ref 135–146)
TRIGL SERPL-MCNC: 71 MG/DL
TSH SERPL-ACNC: 1.95 MIU/L
WBC # BLD AUTO: 4.1 THOUSAND/UL (ref 3.8–10.8)

## 2023-08-28 ENCOUNTER — OFFICE VISIT (OUTPATIENT)
Dept: FAMILY MEDICINE CLINIC | Facility: CLINIC | Age: 53
End: 2023-08-28
Payer: COMMERCIAL

## 2023-08-28 VITALS
BODY MASS INDEX: 27.2 KG/M2 | RESPIRATION RATE: 16 BRPM | SYSTOLIC BLOOD PRESSURE: 100 MMHG | HEIGHT: 62 IN | DIASTOLIC BLOOD PRESSURE: 70 MMHG | TEMPERATURE: 97.8 F | OXYGEN SATURATION: 98 % | HEART RATE: 81 BPM | WEIGHT: 147.8 LBS

## 2023-08-28 DIAGNOSIS — G43.709 CHRONIC MIGRAINE WITHOUT AURA WITHOUT STATUS MIGRAINOSUS, NOT INTRACTABLE: ICD-10-CM

## 2023-08-28 DIAGNOSIS — M25.512 CHRONIC LEFT SHOULDER PAIN: ICD-10-CM

## 2023-08-28 DIAGNOSIS — N92.6 IRREGULAR MENSES: ICD-10-CM

## 2023-08-28 DIAGNOSIS — E78.5 MILD HYPERLIPIDEMIA: Primary | ICD-10-CM

## 2023-08-28 DIAGNOSIS — G89.29 CHRONIC LEFT SHOULDER PAIN: ICD-10-CM

## 2023-08-28 PROCEDURE — 99214 OFFICE O/P EST MOD 30 MIN: CPT | Performed by: FAMILY MEDICINE

## 2023-08-28 NOTE — PROGRESS NOTES
Assessment/Plan:    1. Mild hyperlipidemia  -     Comprehensive metabolic panel; Future; Expected date: 01/01/2024  -     Lipid Panel with Direct LDL reflex; Future; Expected date: 01/01/2024  -     TSH, 3rd generation with Free T4 reflex; Future; Expected date: 01/01/2024  -     Comprehensive metabolic panel  -     Lipid Panel with Direct LDL reflex  -     TSH, 3rd generation with Free T4 reflex    2. Chronic left shoulder pain  -     Ambulatory Referral to Orthopedic Surgery; Future    3. Chronic migraine without aura without status migrainosus, not intractable  Assessment & Plan:  Also has headaches during ntercourse  Will refer to neurology    Orders:  -     Ambulatory Referral to Neurology; Future    4. Irregular menses  Assessment & Plan: Will discuss hormone therapy    Orders:  -     Ambulatory Referral to Obstetrics / Gynecology; Future        Depression Screening and Follow-up Plan: Patient was screened for depression during today's encounter. They screened negative with a PHQ-2 score of 0. There are no Patient Instructions on file for this visit. Return in about 5 months (around 1/28/2024) for Annual physical.    Subjective:      Patient ID: John Ng is a 46 y.o. female. Chief Complaint   Patient presents with   • Follow-up     Patient here for 6 month follow up on Hyperlipidemia        Here for follow up  Labs reviewed  Left shoulder issue- unable to clip bra in back  No trauma  covid booster in Dec, shoulder pain and dec rom since then  Headaches during sex  Last period in Jan  In July start going to gym- 3 times a week    Hyperlipidemia  This is a chronic problem. The current episode started more than 1 year ago. The problem is controlled. Recent lipid tests were reviewed and are normal. There are no known factors aggravating her hyperlipidemia. Current antihyperlipidemic treatment includes statins. The current treatment provides significant improvement of lipids.  There are no compliance problems. The following portions of the patient's history were reviewed and updated as appropriate: allergies, current medications, past family history, past medical history, past social history, past surgical history and problem list.    Review of Systems   Constitutional: Negative. HENT: Negative. Eyes: Negative. Respiratory: Negative. Cardiovascular: Negative. Gastrointestinal: Negative. Genitourinary: Positive for menstrual problem. Musculoskeletal: Positive for arthralgias (shoulder pain). Allergic/Immunologic: Negative. Neurological: Positive for headaches. Psychiatric/Behavioral: Negative. Current Outpatient Medications   Medication Sig Dispense Refill   • Ascorbic Acid (vitamin C) 1000 MG tablet Take 1,000 mg by mouth daily     • cholecalciferol (VITAMIN D3) 1,000 units tablet Take 1 tablet by mouth daily     • Omega-3 Fatty Acids (FISH OIL PO) Take by mouth Taking 1250 mg     • rosuvastatin (CRESTOR) 10 MG tablet Take 1 tablet (10 mg total) by mouth daily 90 tablet 3     No current facility-administered medications for this visit. Objective:    /70 (BP Location: Left arm, Patient Position: Sitting, Cuff Size: Standard)   Pulse 81   Temp 97.8 °F (36.6 °C) (Tympanic)   Resp 16   Ht 5' 1.58" (1.564 m)   Wt 67 kg (147 lb 12.8 oz)   LMP 10/16/2021 Comment: denies preg  SpO2 98%   BMI 27.40 kg/m²        Physical Exam  Vitals and nursing note reviewed. Constitutional:       Appearance: Normal appearance. She is well-developed. HENT:      Head: Normocephalic and atraumatic. Nose: Nose normal.   Eyes:      General: Lids are normal.      Conjunctiva/sclera: Conjunctivae normal.      Pupils: Pupils are equal, round, and reactive to light. Cardiovascular:      Rate and Rhythm: Normal rate and regular rhythm.       Heart sounds: Normal heart sounds, S1 normal and S2 normal.   Pulmonary:      Effort: Pulmonary effort is normal. Breath sounds: Normal breath sounds. Abdominal:      General: Bowel sounds are normal.      Palpations: Abdomen is soft. Musculoskeletal:         General: Normal range of motion. Cervical back: Normal range of motion and neck supple. Skin:     General: Skin is warm and dry. Neurological:      General: No focal deficit present. Mental Status: She is alert and oriented to person, place, and time. Deep Tendon Reflexes: Reflexes are normal and symmetric. Psychiatric:         Mood and Affect: Mood normal.         Speech: Speech normal.         Behavior: Behavior normal.         Thought Content:  Thought content normal.         Judgment: Judgment normal.                Wellington Murdock, DO

## 2023-08-31 ENCOUNTER — APPOINTMENT (OUTPATIENT)
Dept: RADIOLOGY | Facility: OTHER | Age: 53
End: 2023-08-31
Payer: COMMERCIAL

## 2023-08-31 ENCOUNTER — OFFICE VISIT (OUTPATIENT)
Dept: OBGYN CLINIC | Facility: OTHER | Age: 53
End: 2023-08-31
Payer: COMMERCIAL

## 2023-08-31 VITALS
DIASTOLIC BLOOD PRESSURE: 77 MMHG | SYSTOLIC BLOOD PRESSURE: 115 MMHG | HEART RATE: 90 BPM | BODY MASS INDEX: 27.05 KG/M2 | HEIGHT: 62 IN | WEIGHT: 147 LBS

## 2023-08-31 DIAGNOSIS — M75.02 ADHESIVE CAPSULITIS OF LEFT SHOULDER: Primary | ICD-10-CM

## 2023-08-31 DIAGNOSIS — M25.512 CHRONIC LEFT SHOULDER PAIN: ICD-10-CM

## 2023-08-31 DIAGNOSIS — G89.29 CHRONIC LEFT SHOULDER PAIN: ICD-10-CM

## 2023-08-31 PROCEDURE — 99204 OFFICE O/P NEW MOD 45 MIN: CPT | Performed by: ORTHOPAEDIC SURGERY

## 2023-08-31 PROCEDURE — 73030 X-RAY EXAM OF SHOULDER: CPT

## 2023-08-31 PROCEDURE — 20610 DRAIN/INJ JOINT/BURSA W/O US: CPT | Performed by: ORTHOPAEDIC SURGERY

## 2023-08-31 RX ORDER — BUPIVACAINE HYDROCHLORIDE 2.5 MG/ML
2 INJECTION, SOLUTION INFILTRATION; PERINEURAL
Status: COMPLETED | OUTPATIENT
Start: 2023-08-31 | End: 2023-08-31

## 2023-08-31 RX ORDER — BETAMETHASONE SODIUM PHOSPHATE AND BETAMETHASONE ACETATE 3; 3 MG/ML; MG/ML
6 INJECTION, SUSPENSION INTRA-ARTICULAR; INTRALESIONAL; INTRAMUSCULAR; SOFT TISSUE
Status: COMPLETED | OUTPATIENT
Start: 2023-08-31 | End: 2023-08-31

## 2023-08-31 RX ADMIN — BETAMETHASONE SODIUM PHOSPHATE AND BETAMETHASONE ACETATE 6 MG: 3; 3 INJECTION, SUSPENSION INTRA-ARTICULAR; INTRALESIONAL; INTRAMUSCULAR; SOFT TISSUE at 08:30

## 2023-08-31 RX ADMIN — BUPIVACAINE HYDROCHLORIDE 2 ML: 2.5 INJECTION, SOLUTION INFILTRATION; PERINEURAL at 08:30

## 2023-08-31 NOTE — PATIENT INSTRUCTIONS
CORTICOSTEROID INJECTION  What is a corticosteroid? Injuries or disease such as arthritis, bursitis or tendonitis result in inflammation. In turn, this inflammation can cause swelling and pain. A local injection of a corticosteroid is provided to diminish inflammation. By doing so, it will also decrease pain and swelling which is making you uncomfortable. Is this the same thing as a Cortisone Injection? Cortisone® is a brand name of a corticosteroid used commonly in the past.  Today I commonly use a more water-soluble corticosteroid named Celestone®. Will the injection hurt? As with any injection, you may feel pain at the time of the injection. Typically, I use a local anesthetic (Montrell Eglin) in addition to the corticosteroid to determine if the injection has been placed in the appropriate location. Hence it is important to monitor your symptoms 4-6 hours after the injection, as the area will be anesthetized (numb) while the local anesthetic is working. Once the local anesthetic wears off, the intensity of pain can be the same as it was prior to the injection, or even worse. This does not mean that the injection is not working. The corticosteroid may take 24-72 hours to begin having a positive effect. If you do experience an increase in pain, the use of an ice pack on the area for 20 minutes at a time should help. It is also helpful to take an oral anti-inflammatory such as Tylenol® or Motrin® if you are able to medically do so. For this reason it is best to avoid activities that put stress on the area the first 24 hours after the injection. How long will pain relief last?  This will vary according to the type and severity of the symptoms being treated and the severity of the condition. Symptom relief may last weeks to months. I typically couple injections with physical therapy so that the underlying problem causing the inflammation may be treated as the pain diminishes.   If the combination is not successful, you may be a surgical candidate. I have read bad things about steroids. Will these things happen to me? Corticosteroids, when utilized properly, are safe and effective drugs. When used in a low dose, potential adverse reactions are very rare. Some patients may experience a sensation of flushing for several days. Very rarely, there can be a local reaction which may include increased discomfort for a period of time in the areas that has been injected. A steroid should not be used over and over again. Multiple injections in the same area can produce adverse effects such as tissue atrophy and degeneration of tendon or cartilage. A small percentage of patients (< 0.1%) may develop an infection in the joint after injection. This is a treatable problem, but if neglected, may result in permanent disability. Signs of infection include redness, swelling, discharge, fevers, increasing pain and drainage from the injection site. This represents an emergency and you should contact our office immediately or seek treatment in the ER if after hours. If I have diabetes, will this injection affect me? If you are diabetic, an injection of a corticosteroid can raise your blood sugar level, requiring more insulin for a brief period of time. This may necessitate careful blood sugar maintenance. If the elevated sugars are not able to be controlled, contact your diabetic doctor for guidance.

## 2023-08-31 NOTE — PROGRESS NOTES
Assessment  Diagnoses and all orders for this visit:    Adhesive capsulitis of left shoulder        Discussion and Plan:    The patient has an examination consistent with adhesive capsulitis of the left shoulder. I have discussed with the patient the pathophysiology of this diagnosis and reviewed how the examination correlates with this diagnosis. Treatment options were discussed at length and after discussing these treatment options, the patient elected to receive a glenohumeral injection of corticosteroid (as described in the procedure note) with a prescription for referral to physical therapy. Explained to the patient that this can take 6-9 mos to improve from the start of treatment. Subjective:   Patient ID: Min Nicolas is a 46 y.o. female      HPI  The patient presents with a chief complaint of left shoulder pain. The pain began several month(s) ago and is not associated with an acute injury. Symptoms started after receving a COVID bursa. The patient describes the pain as aching, dull and sharp in intensity,  intermittent in timing, and localizes the pain to the  left globally. The pain is worse with movement, overuse and raising arm over head and relieved by rest.  The pain is not associated with numbness and tingling. The pain is not associated with constitutional symptoms. The patient is not awoken at night by the pain. The patient has not had any treatment.   Patient was seen by Dr. Edward Gilliland on August 28, 2023 and sent to see me in orthopedic surgical consultation for this chief complaint of left chronic shoulder pain          The following portions of the patient's history were reviewed and updated as appropriate: allergies, current medications, past family history, past medical history, past social history, past surgical history and problem list.      Objective:  /77   Pulse 90   Ht 5' 1.5" (1.562 m)   Wt 66.7 kg (147 lb)   LMP 10/16/2021 Comment: denies preg  BMI 27.33 kg/m² Left Shoulder Exam     Range of Motion   Passive abduction: 40   External rotation: 20   Forward flexion: 110   Internal rotation 0 degrees: Sacrum     Other   Erythema: absent  Sensation: normal  Pulse: present             Physical Exam  Vitals reviewed. Constitutional:       Appearance: She is well-developed. Eyes:      Pupils: Pupils are equal, round, and reactive to light. Pulmonary:      Effort: Pulmonary effort is normal.      Breath sounds: Normal breath sounds. Abdominal:      General: Abdomen is flat. There is no distension. Skin:     General: Skin is warm and dry. Neurological:      Mental Status: She is alert and oriented to person, place, and time. Psychiatric:         Behavior: Behavior normal.         Thought Content: Thought content normal.         Judgment: Judgment normal.       Large joint arthrocentesis: L glenohumeral  Universal Protocol:  Consent: Verbal consent obtained. Consent given by: patient  Patient understanding: patient states understanding of the procedure being performed  Site marked: the operative site was marked  Patient identity confirmed: verbally with patient    Supporting Documentation  Indications: pain   Procedure Details  Location: shoulder - L glenohumeral  Needle size: 22 G  Ultrasound guidance: no  Approach: posterior  Medications administered: 2 mL bupivacaine 0.25 %; 6 mg betamethasone acetate-betamethasone sodium phosphate 6 (3-3) mg/mL    Patient tolerance: patient tolerated the procedure well with no immediate complications  Dressing:  Sterile dressing applied          I have personally reviewed pertinent films in PACS and my interpretation is as follows. Left shoulder x-rays demonstrates no fracture or dislocation.     Scribe Attestation    I,:  Tanya Anderson am acting as a scribe while in the presence of the attending physician.:       I,:  Linda Carrasco MD personally performed the services described in this documentation    as scribed in my presence.:

## 2023-09-07 ENCOUNTER — EVALUATION (OUTPATIENT)
Dept: PHYSICAL THERAPY | Facility: CLINIC | Age: 53
End: 2023-09-07
Payer: COMMERCIAL

## 2023-09-07 DIAGNOSIS — M25.512 CHRONIC LEFT SHOULDER PAIN: ICD-10-CM

## 2023-09-07 DIAGNOSIS — M75.02 ADHESIVE CAPSULITIS OF LEFT SHOULDER: ICD-10-CM

## 2023-09-07 DIAGNOSIS — G89.29 CHRONIC LEFT SHOULDER PAIN: ICD-10-CM

## 2023-09-07 PROCEDURE — 97161 PT EVAL LOW COMPLEX 20 MIN: CPT | Performed by: PHYSICAL THERAPIST

## 2023-09-07 PROCEDURE — 97535 SELF CARE MNGMENT TRAINING: CPT | Performed by: PHYSICAL THERAPIST

## 2023-09-07 PROCEDURE — 97110 THERAPEUTIC EXERCISES: CPT | Performed by: PHYSICAL THERAPIST

## 2023-09-07 NOTE — PROGRESS NOTES
PT Evaluation     Today's date: 2023  Patient name: Itzel Anderson  : 1970  MRN: [id number]  Referring provider: Olman Miranda  Dx:   Encounter Diagnosis     ICD-10-CM    1. Chronic left shoulder pain  M25.512 Ambulatory Referral to Physical Therapy    G89.29       2. Adhesive capsulitis of left shoulder  M75.02 Ambulatory Referral to Physical Therapy                     Assessment  Assessment details: Pt presents with s/s consistent with L adhesive capsulitis. Pt will benefit from PT to address impairments and restore function. Impairments: abnormal or restricted ROM, activity intolerance, lacks appropriate home exercise program, pain with function, poor posture  and poor body mechanics    Goals  ST-4 weeks. 1.  Pt will decrease pain > 25%. 2.  Pt will increase PROM > 25%. LT-8 weeks. 1.  Pt will decrease pain > 50%. 2.  Pt will increase AROM > 50%. Plan  Planned modality interventions: low level laser therapy  Planned therapy interventions: manual therapy, patient education, postural training, self care, stretching, therapeutic activities, therapeutic exercise, flexibility, functional ROM exercises and home exercise program  Frequency: 2x week  Duration in weeks: 4        Subjective Evaluation    History of Present Illness  Mechanism of injury: Pt is a 46 yof c/o L shoulder pain that began with an insidious onset in Dec 2022.   Patient Goals  Patient goals for therapy: decreased pain, increased strength, independence with ADLs/IADLs, return to sport/leisure activities and return to work  Patient goal: return to gym routine  Pain  Current pain ratin  At best pain ratin  At worst pain ratin  Location: L upper arm  Quality: sharp and dull ache  Relieving factors: rest  Aggravating factors: lifting and overhead activity (functional IR)  Progression: no change    Treatments  Previous treatment: injection treatment (23 cortisone injection 10-15%.)        Objective Postural Observations  Seated posture: poor  Standing posture: fair        Active Range of Motion   Left Shoulder   Flexion: 110 degrees with pain  Abduction: 85 degrees with pain  External rotation BTH: C4 with pain  Internal rotation 90°: Left shoulder active internal rotation at 90 degrees: 80 deg of ABD. Internal rotation BTB: sacrum with pain    Additional Active Range of Motion Details  Rep ext with IR: NE  Rep ext with ER: NE    Passive Range of Motion   Left Shoulder   Flexion: 115 degrees with pain  Abduction: 85 degrees with pain  External rotation 0°: 20 degrees with pain  Internal rotation 90°: 20 (80 deg of ABD) degrees with pain    Strength/Myotome Testing     Left Shoulder     Planes of Motion   External rotation at 0°: 4              Precautions: none.     Dx: L FS Dec 2022    Manuals 9/7            L sh PROM             L GH Gr IV mobs             Laser L sh                          Neuro Re-Ed                                                                                                        Ther Ex             Pulleys flex 10"x10            Pulleys ABD 10"x10            AAROM ER 10"x10            Standing mod sleeper stretch 10"x3            AROM flex             AROM ABD             AROM  IR             AROM ER             Ther Activity                                       Gait Training                                       Modalities

## 2023-09-11 ENCOUNTER — OFFICE VISIT (OUTPATIENT)
Dept: PHYSICAL THERAPY | Facility: CLINIC | Age: 53
End: 2023-09-11
Payer: COMMERCIAL

## 2023-09-11 DIAGNOSIS — M25.512 CHRONIC LEFT SHOULDER PAIN: Primary | ICD-10-CM

## 2023-09-11 DIAGNOSIS — M75.02 ADHESIVE CAPSULITIS OF LEFT SHOULDER: ICD-10-CM

## 2023-09-11 DIAGNOSIS — G89.29 CHRONIC LEFT SHOULDER PAIN: Primary | ICD-10-CM

## 2023-09-11 PROCEDURE — 97140 MANUAL THERAPY 1/> REGIONS: CPT | Performed by: PHYSICAL THERAPIST

## 2023-09-11 PROCEDURE — 97110 THERAPEUTIC EXERCISES: CPT | Performed by: PHYSICAL THERAPIST

## 2023-09-11 NOTE — PROGRESS NOTES
Daily Note     Today's date: 2023  Patient name: Itzel Anderson  : 1970  MRN: [id number]  Referring provider: Olman Miranda  Dx:   Encounter Diagnosis     ICD-10-CM    1. Chronic left shoulder pain  M25.512     G89.29       2. Adhesive capsulitis of left shoulder  M75.02                      Subjective:  Pt reports 6/10 L shoulder pain with stretching, 0/10 pain at rest, good compliance with HEP 2 x D. Objective: See treatment diary below    Assessment: Pt demonstrated improved elevation PROM. Plan: Cont POC. Precautions: none.     Dx: L FS Dec 2022    Manuals            L sh PROM  10 min           L GH Gr IV mobs  1x40           Laser L sh  4000J                        Neuro Re-Ed                                                                                                        Ther Ex             Pulleys flex 10"x10 10"/ 3x10           Pulleys ABD 10"x10 10"x10           AAROM ER 10"x10 10"x10           Standing mod sleeper stretch 10"x3 10"x1*           SL mod sleeper stretch  10"x3           AROM flex  1x5           AROM ABD  1x5           AROM  IR  1x2           AROM ER  1x2           Ther Activity                                       Gait Training                                       Modalities

## 2023-09-14 ENCOUNTER — OFFICE VISIT (OUTPATIENT)
Dept: PHYSICAL THERAPY | Facility: CLINIC | Age: 53
End: 2023-09-14
Payer: COMMERCIAL

## 2023-09-14 DIAGNOSIS — M75.02 ADHESIVE CAPSULITIS OF LEFT SHOULDER: ICD-10-CM

## 2023-09-14 DIAGNOSIS — M25.512 CHRONIC LEFT SHOULDER PAIN: Primary | ICD-10-CM

## 2023-09-14 DIAGNOSIS — G89.29 CHRONIC LEFT SHOULDER PAIN: Primary | ICD-10-CM

## 2023-09-14 PROCEDURE — 97140 MANUAL THERAPY 1/> REGIONS: CPT

## 2023-09-14 PROCEDURE — 97110 THERAPEUTIC EXERCISES: CPT

## 2023-09-14 NOTE — PROGRESS NOTES
Daily Note     Today's date: 2023  Patient name: Jackie Wynn  : 1970  MRN: [id number]  Referring provider: Anastasiya Joseph  Dx:   Encounter Diagnosis     ICD-10-CM    1. Chronic left shoulder pain  M25.512     G89.29       2. Adhesive capsulitis of left shoulder  M75.02                      Subjective: pt reports shoulder soreness for one day after LV. Overall, no change with her shoulder. Objective: See treatment diary below      Assessment: Apprehension noted with AROM. Some improvement in shoulder mobility noted post manual interventions. Continued PT would be beneficial to improve function.          Plan: Continue per plan of care. Precautions: none.     Dx: L FS Dec 2022    Manuals           L sh PROM  10 min 10 min          L GH Gr IV mobs  1x40 1x40- KF          Laser L sh  4000J 4000J                       Neuro Re-Ed                                                                                                        Ther Ex             Pulleys flex 10"x10 10"/ 3x10 10"/ 3x10          Pulleys ABD 10"x10 10"x10 10"x10          AAROM ER 10"x10 10"x10 10x10"          Standing mod sleeper stretch 10"x3 10"x1* 10"x3          SL mod sleeper stretch  10"x3 10"x3          AROM flex  1x5 1x5          AROM ABD  1x5 1x5          AROM  IR  1x2 1x5          AROM ER  1x2 1x5          Ther Activity                                       Gait Training                                       Modalities

## 2023-09-19 ENCOUNTER — OFFICE VISIT (OUTPATIENT)
Dept: PHYSICAL THERAPY | Facility: CLINIC | Age: 53
End: 2023-09-19
Payer: COMMERCIAL

## 2023-09-19 DIAGNOSIS — M75.02 ADHESIVE CAPSULITIS OF LEFT SHOULDER: ICD-10-CM

## 2023-09-19 DIAGNOSIS — M25.512 CHRONIC LEFT SHOULDER PAIN: Primary | ICD-10-CM

## 2023-09-19 DIAGNOSIS — G89.29 CHRONIC LEFT SHOULDER PAIN: Primary | ICD-10-CM

## 2023-09-19 PROCEDURE — 97140 MANUAL THERAPY 1/> REGIONS: CPT | Performed by: PHYSICAL THERAPIST

## 2023-09-19 PROCEDURE — 97110 THERAPEUTIC EXERCISES: CPT | Performed by: PHYSICAL THERAPIST

## 2023-09-21 ENCOUNTER — OFFICE VISIT (OUTPATIENT)
Dept: PHYSICAL THERAPY | Facility: CLINIC | Age: 53
End: 2023-09-21
Payer: COMMERCIAL

## 2023-09-21 DIAGNOSIS — G89.29 CHRONIC LEFT SHOULDER PAIN: Primary | ICD-10-CM

## 2023-09-21 DIAGNOSIS — M75.02 ADHESIVE CAPSULITIS OF LEFT SHOULDER: ICD-10-CM

## 2023-09-21 DIAGNOSIS — M25.512 CHRONIC LEFT SHOULDER PAIN: Primary | ICD-10-CM

## 2023-09-21 PROCEDURE — 97110 THERAPEUTIC EXERCISES: CPT

## 2023-09-21 PROCEDURE — 97140 MANUAL THERAPY 1/> REGIONS: CPT

## 2023-09-21 NOTE — PROGRESS NOTES
Daily Note     Today's date: 2023  Patient name: Piotr Jaeger  : 1970  MRN: [id number]  Referring provider: Chadd Mathews  Dx:   Encounter Diagnosis     ICD-10-CM    1. Chronic left shoulder pain  M25.512     G89.29       2. Adhesive capsulitis of left shoulder  M75.02                      Subjective: Pt reports overhead lifting and reaching behind her back is improving. Objective: See treatment diary below    Assessment: Pt demonstrated most restriction with IR and ER ROM. Plan: Continue per plan of care. Precautions: none.     Dx: L FS Dec 2022    Manuals         L sh PROM  10 min 10 min 10 min 10 min        L GH Gr IV mobs  1x40 1x40- KF 1x40         Laser L sh  4000J 4000J 4000J 4000J                     Neuro Re-Ed                                                                                                        Ther Ex             Pulleys flex 10"x10 10"/ 3x10 10"/ 3x10 10"x10 10"x10        Pulleys ABD 10"x10 10"x10 10"x10 10"x10 10"x10        AAROM ER 10"x10 10"x10 10x10" 10"x10 10"x10        Standing mod sleeper stretch 10"x3 10"x1* 10"x3 10"x3 10"x3        SL mod sleeper stretch  10"x3 10"x3 10"x3 10"x3        AROM flex  1x5 1x5 1x6 1x8        AROM ABD  1x5 1x5 1x6 1x8        AROM  IR  1x2 1x5 1x6 1x6        AROM ER  1x2 1x5 1x6 1x6        Ther Activity                                       Gait Training                                       Modalities

## 2023-09-25 ENCOUNTER — OFFICE VISIT (OUTPATIENT)
Dept: PHYSICAL THERAPY | Facility: CLINIC | Age: 53
End: 2023-09-25
Payer: COMMERCIAL

## 2023-09-25 DIAGNOSIS — M25.512 CHRONIC LEFT SHOULDER PAIN: Primary | ICD-10-CM

## 2023-09-25 DIAGNOSIS — G89.29 CHRONIC LEFT SHOULDER PAIN: Primary | ICD-10-CM

## 2023-09-25 DIAGNOSIS — M75.02 ADHESIVE CAPSULITIS OF LEFT SHOULDER: ICD-10-CM

## 2023-09-25 PROCEDURE — 97140 MANUAL THERAPY 1/> REGIONS: CPT | Performed by: PHYSICAL THERAPIST

## 2023-09-25 PROCEDURE — 97110 THERAPEUTIC EXERCISES: CPT | Performed by: PHYSICAL THERAPIST

## 2023-09-25 NOTE — PROGRESS NOTES
Daily Note     Today's date: 2023  Patient name: Lilian Matson  : 1970  MRN: [id number]  Referring provider: Andrea Tristan  Dx:   Encounter Diagnosis     ICD-10-CM    1. Chronic left shoulder pain  M25.512     G89.29       2. Adhesive capsulitis of left shoulder  M75.02                      Subjective: Pt reports improved tolerance to overhead reaching. Objective: See treatment diary below    Assessment: Pt demonstrated improved IR, pain and restriction with PROM ABD. Plan: Continue per plan of care. Precautions: none.     Dx: L FS Dec 2022    Manuals         L sh PROM  10 min 10 min 10 min 10 min        L GH Gr IV mobs  1x40 1x40- KF 1x40 1x40        Laser L sh  4000J 4000J 4000J 4000J                     Neuro Re-Ed                                                                                                        Ther Ex             Pulleys flex 10"x10 10"/ 3x10 10"/ 3x10 10"x10 10"x10        Pulleys ABD 10"x10 10"x10 10"x10 10"x10 10"x10        AAROM ER 10"x10 10"x10 10x10" 10"x10 10"x10        Standing mod sleeper stretch 10"x3 10"x1* 10"x3 10"x3 10"x3        SL mod sleeper stretch  10"x3 10"x3 10"x3 10"x3        AROM flex  1x5 1x5 1x6 1x8        AROM ABD  1x5 1x5 1x6 1x8        AROM  IR  1x2 1x5 1x6 1x8        AROM ER  1x2 1x5 1x6 1x8        Ther Activity                                       Gait Training                                       Modalities

## 2023-09-28 ENCOUNTER — OFFICE VISIT (OUTPATIENT)
Dept: PHYSICAL THERAPY | Facility: CLINIC | Age: 53
End: 2023-09-28
Payer: COMMERCIAL

## 2023-09-28 DIAGNOSIS — M25.512 CHRONIC LEFT SHOULDER PAIN: Primary | ICD-10-CM

## 2023-09-28 DIAGNOSIS — G89.29 CHRONIC LEFT SHOULDER PAIN: Primary | ICD-10-CM

## 2023-09-28 DIAGNOSIS — M75.02 ADHESIVE CAPSULITIS OF LEFT SHOULDER: ICD-10-CM

## 2023-09-28 PROCEDURE — 97110 THERAPEUTIC EXERCISES: CPT | Performed by: PHYSICAL THERAPIST

## 2023-09-28 PROCEDURE — 97140 MANUAL THERAPY 1/> REGIONS: CPT | Performed by: PHYSICAL THERAPIST

## 2023-09-28 NOTE — PROGRESS NOTES
Daily Note     Today's date: 2023  Patient name: Fatou Cruz  : 1970  MRN: [id number]  Referring provider: Amandeep Cortez  Dx:   Encounter Diagnosis     ICD-10-CM    1. Chronic left shoulder pain  M25.512     G89.29       2. Adhesive capsulitis of left shoulder  M75.02                      Subjective: Pt reports improved tolerance to overhead reaching. Objective: See treatment diary below    Assessment: Pt demonstrated improved scaption AROM, limited and painful ABD. Plan: Continue per plan of care. Precautions: none.     Dx: L FS Dec 2022    Manuals        L sh PROM  10 min 10 min 10 min 10 min 10 min        L GH Gr IV mobs  1x40 1x40- KF 1x40 1x40 1x40       Laser L sh  4000J 4000J 4000J 4000J 4000J                    Neuro Re-Ed                                                                                                        Ther Ex             Pulleys flex 10"x10 10"/ 3x10 10"/ 3x10 10"x10 10"x10 10"x10       Pulleys ABD 10"x10 10"x10 10"x10 10"x10 10"x10 10"x10       AAROM ER 10"x10 10"x10 10x10" 10"x10 10"x10 10"x10       Standing mod sleeper stretch 10"x3 10"x1* 10"x3 10"x3 10"x3 10"x3       SL mod sleeper stretch  10"x3 10"x3 10"x3 10"x3 10"x3       AROM flex  1x5 1x5 1x6 1x8 1x10       AROM ABD  1x5 1x5 1x6 1x8 1x10       AROM  IR  1x2 1x5 1x6 1x8 1x10       AROM ER  1x2 1x5 1x6 1x8 1x10       Ther Activity                                       Gait Training                                       Modalities

## 2023-10-03 ENCOUNTER — OFFICE VISIT (OUTPATIENT)
Dept: PHYSICAL THERAPY | Facility: CLINIC | Age: 53
End: 2023-10-03
Payer: COMMERCIAL

## 2023-10-03 DIAGNOSIS — G89.29 CHRONIC LEFT SHOULDER PAIN: Primary | ICD-10-CM

## 2023-10-03 DIAGNOSIS — M75.02 ADHESIVE CAPSULITIS OF LEFT SHOULDER: ICD-10-CM

## 2023-10-03 DIAGNOSIS — M25.512 CHRONIC LEFT SHOULDER PAIN: Primary | ICD-10-CM

## 2023-10-03 PROCEDURE — 97110 THERAPEUTIC EXERCISES: CPT | Performed by: PHYSICAL THERAPIST

## 2023-10-03 PROCEDURE — 97140 MANUAL THERAPY 1/> REGIONS: CPT | Performed by: PHYSICAL THERAPIST

## 2023-10-03 NOTE — PROGRESS NOTES
Daily Note     Today's date: 10/3/2023  Patient name: Gilson Tilley  : 1970  MRN: [id number]  Referring provider: Marda Blizzard  Dx:   Encounter Diagnosis     ICD-10-CM    1. Chronic left shoulder pain  M25.512     G89.29       2. Adhesive capsulitis of left shoulder  M75.02                      Subjective: Pt reports improved overhead reaching. Pt reports being able to put a cup on the top shelf but not a plate. Pt reports continued difficulty with reaching into the small of her back. Objective: See treatment diary below    Assessment: Pt demonstrated improved scaption AROM, limited and painful ABD. Plan: Continue per plan of care. Assess functional IR nv, consider adding rep sh ext or IR. Precautions: none.     Dx: L FS Dec 2022    Manuals 9/7 9/11 9/14 9/19 9/25 9/28 10/3      L sh PROM  10 min 10 min 10 min 10 min 10 min  10 min      L GH Gr IV mobs  1x40 1x40- KF 1x40 1x40 1x40 1x40      Laser L sh  4000J 4000J 4000J 4000J 4000J 4000J                   Neuro Re-Ed                                                                                                        Ther Ex             Pulleys flex 10"x10 10"/ 3x10 10"/ 3x10 10"x10 10"x10 10"x10 10"x10      Pulleys ABD 10"x10 10"x10 10"x10 10"x10 10"x10 10"x10 10"x10      AAROM ER 10"x10 10"x10 10x10" 10"x10 10"x10 10"x10 10"x10      Standing mod sleeper stretch 10"x3 10"x1* 10"x3 10"x3 10"x3 10"x3 10"x3      SL mod sleeper stretch  10"x3 10"x3 10"x3 10"x3 10"x3       AROM flex  1x5 1x5 1x6 1x8 1x10 1x10      AROM ABD  1x5 1x5 1x6 1x8 1x10 1x10      AROM  IR  1x2 1x5 1x6 1x8 1x10 1x10      AROM ER  1x2 1x5 1x6 1x8 1x10 1x10      Ther Activity                                       Gait Training                                       Modalities

## 2023-10-05 ENCOUNTER — OFFICE VISIT (OUTPATIENT)
Dept: PHYSICAL THERAPY | Facility: CLINIC | Age: 53
End: 2023-10-05
Payer: COMMERCIAL

## 2023-10-05 DIAGNOSIS — M25.512 CHRONIC LEFT SHOULDER PAIN: Primary | ICD-10-CM

## 2023-10-05 DIAGNOSIS — M75.02 ADHESIVE CAPSULITIS OF LEFT SHOULDER: ICD-10-CM

## 2023-10-05 DIAGNOSIS — G89.29 CHRONIC LEFT SHOULDER PAIN: Primary | ICD-10-CM

## 2023-10-05 PROCEDURE — 97110 THERAPEUTIC EXERCISES: CPT | Performed by: PHYSICAL THERAPIST

## 2023-10-05 NOTE — PROGRESS NOTES
Daily Note     Today's date: 10/5/2023  Patient name: Vilma Nieves  : 1970  MRN: [id number]  Referring provider: Nomi Palacios  Dx:   Encounter Diagnosis     ICD-10-CM    1. Chronic left shoulder pain  M25.512     G89.29       2. Adhesive capsulitis of left shoulder  M75.02                      Subjective: Pt reports her greatest difficulty is with reaching into the small of her back. Objective: See treatment diary below    Assessment: Added IR MWM to improve functional IR. Plan: Continue per plan of care. Precautions: none.     Dx: L FS Dec 2022    Manuals 9/7 9/11 9/14 9/19 9/25 9/28 10/5      L sh PROM  10 min 10 min 10 min 10 min 10 min  10 min      L GH Gr IV mobs  1x40 1x40- KF 1x40 1x40 1x40 1x40      Laser L sh  4000J 4000J 4000J 4000J 4000J 4000J      L IR MWM       1x10      Neuro Re-Ed                                                                                                        Ther Ex             Pulleys flex 10"x10 10"/ 3x10 10"/ 3x10 10"x10 10"x10 10"x10 10"x10      Pulleys ABD 10"x10 10"x10 10"x10 10"x10 10"x10 10"x10 10"x10      AAROM ER 10"x10 10"x10 10x10" 10"x10 10"x10 10"x10 10"x10      Standing mod sleeper stretch 10"x3 10"x1* 10"x3 10"x3 10"x3 10"x3 10"x3      SL mod sleeper stretch  10"x3 10"x3 10"x3 10"x3 10"x3       AROM flex  1x5 1x5 1x6 1x8 1x10 1x10      AROM ABD  1x5 1x5 1x6 1x8 1x10 1x10      AROM  IR  1x2 1x5 1x6 1x8 1x10 1x10      AROM ER  1x2 1x5 1x6 1x8 1x10 1x10      Ther Activity                                       Gait Training                                       Modalities

## 2023-10-09 ENCOUNTER — OFFICE VISIT (OUTPATIENT)
Dept: PHYSICAL THERAPY | Facility: CLINIC | Age: 53
End: 2023-10-09
Payer: COMMERCIAL

## 2023-10-09 DIAGNOSIS — M25.512 CHRONIC LEFT SHOULDER PAIN: Primary | ICD-10-CM

## 2023-10-09 DIAGNOSIS — G89.29 CHRONIC LEFT SHOULDER PAIN: Primary | ICD-10-CM

## 2023-10-09 DIAGNOSIS — M75.02 ADHESIVE CAPSULITIS OF LEFT SHOULDER: ICD-10-CM

## 2023-10-09 PROCEDURE — 97110 THERAPEUTIC EXERCISES: CPT | Performed by: PHYSICAL THERAPIST

## 2023-10-09 PROCEDURE — 97140 MANUAL THERAPY 1/> REGIONS: CPT | Performed by: PHYSICAL THERAPIST

## 2023-10-09 NOTE — PROGRESS NOTES
Daily Note     Today's date: 10/9/2023  Patient name: Asa Benítez  : 1970  MRN: [id number]  Referring provider: Marvel Esteban  Dx:   Encounter Diagnosis     ICD-10-CM    1. Chronic left shoulder pain  M25.512     G89.29       2. Adhesive capsulitis of left shoulder  M75.02                      Subjective: Pt reports 3/10 L sh pain. Objective: See treatment diary below    Assessment: Pt demonstrated improved PROM ABD. Plan: Continue per plan of care. Precautions: none.     Dx: L FS Dec 2022    Manuals 9/7 9/11 9/14 9/19 9/25 9/28 10/5 10/9     L sh PROM  10 min 10 min 10 min 10 min 10 min  10 min 10 min     L GH Gr IV mobs  1x40 1x40- KF 1x40 1x40 1x40 1x40      Laser L sh  4000J 4000J 4000J 4000J 4000J 4000J 4000J     L IR MWM       1x10 1x5     Neuro Re-Ed                                                                                                        Ther Ex             Pulleys flex 10"x10 10"/ 3x10 10"/ 3x10 10"x10 10"x10 10"x10 10"x10 10"x10     Pulleys ABD 10"x10 10"x10 10"x10 10"x10 10"x10 10"x10 10"x10 10"x10     AAROM ER 10"x10 10"x10 10x10" 10"x10 10"x10 10"x10 10"x10 10"x10     Standing mod sleeper stretch 10"x3 10"x1* 10"x3 10"x3 10"x3 10"x3 10"x3 10"x5     Standing IR with OP  10"x3 10"x3 10"x3 10"x3 10"x3  nv     AROM flex  1x5 1x5 1x6 1x8 1x10 1x10 1x10     AROM ABD  1x5 1x5 1x6 1x8 1x10 1x10 1x10     AROM  IR  1x2 1x5 1x6 1x8 1x10 1x10 1x10     AROM ER  1x2 1x5 1x6 1x8 1x10 1x10 1x10     Ther Activity                                       Gait Training                                       Modalities

## 2023-10-11 ENCOUNTER — OFFICE VISIT (OUTPATIENT)
Dept: PHYSICAL THERAPY | Facility: CLINIC | Age: 53
End: 2023-10-11
Payer: COMMERCIAL

## 2023-10-11 DIAGNOSIS — M75.02 ADHESIVE CAPSULITIS OF LEFT SHOULDER: ICD-10-CM

## 2023-10-11 DIAGNOSIS — G89.29 CHRONIC LEFT SHOULDER PAIN: Primary | ICD-10-CM

## 2023-10-11 DIAGNOSIS — M25.512 CHRONIC LEFT SHOULDER PAIN: Primary | ICD-10-CM

## 2023-10-11 PROCEDURE — 97110 THERAPEUTIC EXERCISES: CPT | Performed by: PHYSICAL THERAPIST

## 2023-10-11 NOTE — PROGRESS NOTES
Daily Note     Today's date: 10/11/2023  Patient name: Jessie Siegel  : 1970  MRN: [id number]  Referring provider: Marilin Alfaro  Dx:   Encounter Diagnosis     ICD-10-CM    1. Chronic left shoulder pain  M25.512     G89.29       2. Adhesive capsulitis of left shoulder  M75.02                      Subjective: Pt reports 3/10 L sh pain. Objective: See treatment diary below    Assessment: Pt demonstrated painful and catching with IR MWM and sh ABD. Plan: Continue per plan of care. Precautions: none.     Dx: L FS Dec 2022    Manuals 9/7 9/11 9/14 9/19 9/25 9/28 10/5 10/9 10/11    L sh PROM  10 min 10 min 10 min 10 min 10 min  10 min 10 min 10 min    L GH Gr IV mobs  1x40 1x40- KF 1x40 1x40 1x40 1x40  1x40    Laser L sh  4000J 4000J 4000J 4000J 4000J 4000J 4000J 4000J    L IR MWM       1x10 1x5     Neuro Re-Ed                                                                                                        Ther Ex             Pulleys flex 10"x10 10"/ 3x10 10"/ 3x10 10"x10 10"x10 10"x10 10"x10 10"x10 10"x10    Pulleys ABD 10"x10 10"x10 10"x10 10"x10 10"x10 10"x10 10"x10 10"x10 10"x10    AAROM ER 10"x10 10"x10 10x10" 10"x10 10"x10 10"x10 10"x10 10"x10 10"x10    Standing mod sleeper stretch 10"x3 10"x1* 10"x3 10"x3 10"x3 10"x3 10"x3 10"x5 10"x5    Standing IR with OP  10"x3 10"x3 10"x3 10"x3 10"x3  nv     AROM flex  1x5 1x5 1x6 1x8 1x10 1x10 1x10 1x10    AROM ABD  1x5 1x5 1x6 1x8 1x10 1x10 1x10 1x10    AROM  IR  1x2 1x5 1x6 1x8 1x10 1x10 1x10 1x10    AROM ER  1x2 1x5 1x6 1x8 1x10 1x10 1x10 1x10    Ther Activity                                       Gait Training                                       Modalities

## 2023-10-17 ENCOUNTER — EVALUATION (OUTPATIENT)
Dept: PHYSICAL THERAPY | Facility: CLINIC | Age: 53
End: 2023-10-17
Payer: COMMERCIAL

## 2023-10-17 DIAGNOSIS — G89.29 CHRONIC LEFT SHOULDER PAIN: Primary | ICD-10-CM

## 2023-10-17 DIAGNOSIS — M25.512 CHRONIC LEFT SHOULDER PAIN: Primary | ICD-10-CM

## 2023-10-17 DIAGNOSIS — M75.02 ADHESIVE CAPSULITIS OF LEFT SHOULDER: ICD-10-CM

## 2023-10-17 PROCEDURE — 97140 MANUAL THERAPY 1/> REGIONS: CPT | Performed by: PHYSICAL THERAPIST

## 2023-10-17 PROCEDURE — 97110 THERAPEUTIC EXERCISES: CPT | Performed by: PHYSICAL THERAPIST

## 2023-10-17 NOTE — PROGRESS NOTES
PT Re-Evaluation     Today's date: 10/17/2023  Patient name: Juan David Flores  : 1970  MRN: [id number]  Referring provider: Aminah Bucio  Dx:   Encounter Diagnosis     ICD-10-CM    1. Chronic left shoulder pain  M25.512     G89.29       2. Adhesive capsulitis of left shoulder  M75.02                      Assessment  Assessment details: Pt demonstrated improved ROM, pain and function. Pt will benefit from continued PT to further improve impairments and restore function, then will be discharged to [facility]. Impairments: abnormal or restricted ROM, activity intolerance, lacks appropriate home exercise program, pain with function, poor posture  and poor body mechanics    Goals  ST-4 weeks. 1.  Pt will decrease pain > 25%. met  2. Pt will increase PROM > 25%. met    LT-8 weeks. 1.  Pt will decrease pain > 50%. 2.  Pt will increase AROM > 50%. Plan  Planned modality interventions: low level laser therapy  Planned therapy interventions: manual therapy, patient education, postural training, self care, stretching, therapeutic activities, therapeutic exercise, flexibility, functional ROM exercises and home exercise program  Frequency: 2x week  Duration in weeks: 4        Subjective Evaluation    History of Present Illness  Mechanism of injury: Pt is a 46 yof c/o L shoulder pain that began with an insidious onset in Dec 2022.   Patient Goals  Patient goals for therapy: decreased pain, increased strength, independence with ADLs/IADLs, return to sport/leisure activities and return to work  Patient goal: return to gym routine  Pain  Current pain ratin  At best pain ratin  At worst pain ratin  Location: L upper arm  Quality: dull ache  Relieving factors: rest  Aggravating factors: lifting and overhead activity (functional IR)  Progression: improved    Treatments  Previous treatment: injection treatment (23 cortisone injection 10-15%.)        Objective     Postural Observations  Seated posture: fair  Standing posture: fair      Active Range of Motion   Left Shoulder   Flexion: 140 degrees with pain  Abduction: 145 (compensation into scaption) degrees with pain  External rotation BTH: T1 with pain  Internal rotation 90°: Left shoulder active internal rotation at 90 degrees: 80 deg of ABD.    Internal rotation BTB: L3 with pain    Passive Range of Motion   Left Shoulder   Flexion: 140 degrees with pain  Abduction: 95 degrees with pain  External rotation 0°: 40 degrees with pain  Internal rotation 90°: 30 (80 deg of ABD) degrees with pain    Strength/Myotome Testing     Left Shoulder     Planes of Motion   Flexion: 3+   Abduction: 3+   External rotation at 0°: 4   Internal rotation at 0°: 4

## 2023-10-17 NOTE — PROGRESS NOTES
Daily Note     Today's date: 10/17/2023  Patient name: Dorothy Phelps  : 1970  MRN: [id number]  Referring provider: Anna Holly  Dx:   Encounter Diagnosis     ICD-10-CM    1. Chronic left shoulder pain  M25.512     G89.29       2. Adhesive capsulitis of left shoulder  M75.02                      Subjective: Pt reports 5/10 L sh pain at worst with reaching into the small of her back. Objective: See treatment diary below    Assessment: Pt demonstrated improved PROM. Plan: Continue per plan of care. Precautions: none.     Dx: L FS Dec 2022    Manuals 9/7 9/11 9/14 9/19 9/25 9/28 10/5 10/9 10/11 10/17   L sh PROM  10 min 10 min 10 min 10 min 10 min  10 min 10 min 10 min 10 min   L GH Gr IV mobs  1x40 1x40- KF 1x40 1x40 1x40 1x40  1x40 1x40   Laser L sh  4000J 4000J 4000J 4000J 4000J 4000J 4000J 4000J 4000J   L IR MWM       1x10 1x5  1x7   Neuro Re-Ed                                                                                                        Ther Ex             Pulleys flex 10"x10 10"/ 3x10 10"/ 3x10 10"x10 10"x10 10"x10 10"x10 10"x10 10"x10 10"x10   Pulleys ABD 10"x10 10"x10 10"x10 10"x10 10"x10 10"x10 10"x10 10"x10 10"x10 10"x10   AAROM ER 10"x10 10"x10 10x10" 10"x10 10"x10 10"x10 10"x10 10"x10 10"x10 10"x10   Standing mod sleeper stretch 10"x3 10"x1* 10"x3 10"x3 10"x3 10"x3 10"x3 10"x5 10"x5 10"x5   Standing IR with OP  10"x3 10"x3 10"x3 10"x3 10"x3  nv     AROM flex  1x5 1x5 1x6 1x8 1x10 1x10 1x10 1x10 1x10   AROM ABD  1x5 1x5 1x6 1x8 1x10 1x10 1x10 1x10 1x10   AROM  IR  1x2 1x5 1x6 1x8 1x10 1x10 1x10 1x10 1x10   AROM ER  1x2 1x5 1x6 1x8 1x10 1x10 1x10 1x10 1x10   Ther Activity                                       Gait Training                                       Modalities

## 2023-10-19 ENCOUNTER — OFFICE VISIT (OUTPATIENT)
Dept: PHYSICAL THERAPY | Facility: CLINIC | Age: 53
End: 2023-10-19
Payer: COMMERCIAL

## 2023-10-19 DIAGNOSIS — M75.02 ADHESIVE CAPSULITIS OF LEFT SHOULDER: ICD-10-CM

## 2023-10-19 DIAGNOSIS — G89.29 CHRONIC LEFT SHOULDER PAIN: Primary | ICD-10-CM

## 2023-10-19 DIAGNOSIS — M25.512 CHRONIC LEFT SHOULDER PAIN: Primary | ICD-10-CM

## 2023-10-19 PROCEDURE — 97140 MANUAL THERAPY 1/> REGIONS: CPT | Performed by: PHYSICAL THERAPIST

## 2023-10-19 PROCEDURE — 97110 THERAPEUTIC EXERCISES: CPT | Performed by: PHYSICAL THERAPIST

## 2023-10-19 NOTE — PROGRESS NOTES
Daily Note     Today's date: 10/19/2023  Patient name: Vilma Nieves  : 1970  MRN: [id number]  Referring provider: Nomi Palacios  Dx:   Encounter Diagnosis     ICD-10-CM    1. Chronic left shoulder pain  M25.512     G89.29       2. Adhesive capsulitis of left shoulder  M75.02           Start Time:   Stop Time:   Total time in clinic (min): 38 minutes    Subjective: Pt reports she is feeling a lot less stiff than before, but still having some pain here and there. Objective: See treatment diary below      Assessment: Pt demonstrates good PROM with abduction and IR being most limited. Passive ER able to get to 65 degrees. She had great improvement in functional IR with MWM and less pain. Patient demonstrated fatigue post treatment, exhibited good technique with therapeutic exercises, and would benefit from continued PT      Plan: Continue per plan of care. Precautions: none.     Dx: L FS Dec 2022    Manuals 10/19    9/25 9/28 10/5 10/9 10/11 10/17   L sh PROM 10 min    10 min 10 min  10 min 10 min 10 min 10 min   L GH Gr IV mobs 1x40    1x40 1x40 1x40  1x40 1x40   Laser L sh 4000J     4000J 4000J 4000J 4000J 4000J 4000J   L IR MWM 1x7      1x10 1x5  1x7   Neuro Re-Ed                                                                                                        Ther Ex             Pulleys flex 10x10"    10"x10 10"x10 10"x10 10"x10 10"x10 10"x10   Pulleys ABD 10x10"    10"x10 10"x10 10"x10 10"x10 10"x10 10"x10   AAROM ER 10x10"    10"x10 10"x10 10"x10 10"x10 10"x10 10"x10   Standing mod sleeper stretch 10"x5    10"x3 10"x3 10"x3 10"x5 10"x5 10"x5   Standing IR with OP 10x5"    10"x3 10"x3  nv     AROM flex 1x10    1x8 1x10 1x10 1x10 1x10 1x10   AROM ABD 1x10    1x8 1x10 1x10 1x10 1x10 1x10   AROM  IR 1x10    1x8 1x10 1x10 1x10 1x10 1x10   AROM ER 1x10    1x8 1x10 1x10 1x10 1x10 1x10   Ther Activity                                       Gait Training Modalities

## 2023-10-24 ENCOUNTER — OFFICE VISIT (OUTPATIENT)
Dept: PHYSICAL THERAPY | Facility: CLINIC | Age: 53
End: 2023-10-24
Payer: COMMERCIAL

## 2023-10-24 DIAGNOSIS — M25.512 CHRONIC LEFT SHOULDER PAIN: Primary | ICD-10-CM

## 2023-10-24 DIAGNOSIS — G89.29 CHRONIC LEFT SHOULDER PAIN: Primary | ICD-10-CM

## 2023-10-24 DIAGNOSIS — M75.02 ADHESIVE CAPSULITIS OF LEFT SHOULDER: ICD-10-CM

## 2023-10-24 PROCEDURE — 97140 MANUAL THERAPY 1/> REGIONS: CPT

## 2023-10-24 PROCEDURE — 97110 THERAPEUTIC EXERCISES: CPT

## 2023-10-24 NOTE — PROGRESS NOTES
Daily Note     Today's date: 10/24/2023  Patient name: Kellen Corona  : 1970  MRN: [id number]  Referring provider: Antonia Escamilla  Dx:   Encounter Diagnosis     ICD-10-CM    1. Chronic left shoulder pain  M25.512     G89.29       2. Adhesive capsulitis of left shoulder  M75.02           Start Time: 1630  Stop Time: 1710  Total time in clinic (min): 40 minutes    Subjective: Patient has no complaints of pain at rest. Notes her ROM continues to improve. Objective: See treatment diary below    Assessment: PROM is limited in ER, IR and abduction. Good AROM noted with slight pain at 70-90° of abduction. Patient demonstrated fatigue post treatment, exhibited good technique with therapeutic exercises, and would benefit from continued PT to regain ROM and function. Plan: Continue per plan of care. Precautions: none.     Dx: L FS Dec 2022    Manuals 10/19 10/24   9/25 9/28 10/5 10/9 10/11 10/17   L sh PROM 10 min 10 min   10 min 10 min  10 min 10 min 10 min 10 min   L GH Gr IV mobs 1x40    1x40 1x40 1x40  1x40 1x40   Laser L sh 4000J  4000J   4000J 4000J 4000J 4000J 4000J 4000J   L IR MWM 1x7      1x10 1x5  1x7   Neuro Re-Ed                                                                                                        Ther Ex             Pulleys flex 10x10" 10x10"   10"x10 10"x10 10"x10 10"x10 10"x10 10"x10   Pulleys ABD 10x10" 10x10"   10"x10 10"x10 10"x10 10"x10 10"x10 10"x10   AAROM ER 10x10" 10x10"   10"x10 10"x10 10"x10 10"x10 10"x10 10"x10   Standing mod sleeper stretch 10"x5 10"x5   10"x3 10"x3 10"x3 10"x5 10"x5 10"x5   Standing IR with OP 10x5" 10x5"   10"x3 10"x3  nv     AROM flex 1x10 1x10   1x8 1x10 1x10 1x10 1x10 1x10   AROM ABD 1x10 1x10   1x8 1x10 1x10 1x10 1x10 1x10   AROM  IR 1x10 1x10   1x8 1x10 1x10 1x10 1x10 1x10   AROM ER 1x10 1x10   1x8 1x10 1x10 1x10 1x10 1x10   Ther Activity                                       Gait Training Modalities

## 2023-10-26 ENCOUNTER — OFFICE VISIT (OUTPATIENT)
Dept: PHYSICAL THERAPY | Facility: CLINIC | Age: 53
End: 2023-10-26
Payer: COMMERCIAL

## 2023-10-26 DIAGNOSIS — M25.512 CHRONIC LEFT SHOULDER PAIN: Primary | ICD-10-CM

## 2023-10-26 DIAGNOSIS — M75.02 ADHESIVE CAPSULITIS OF LEFT SHOULDER: ICD-10-CM

## 2023-10-26 DIAGNOSIS — G89.29 CHRONIC LEFT SHOULDER PAIN: Primary | ICD-10-CM

## 2023-10-26 PROCEDURE — 97110 THERAPEUTIC EXERCISES: CPT | Performed by: PHYSICAL THERAPIST

## 2023-10-26 NOTE — PROGRESS NOTES
Daily Note     Today's date: 10/26/2023  Patient name: Diamond Roman  : 1970  MRN: [id number]  Referring provider: Riaz Hallman  Dx:   Encounter Diagnosis     ICD-10-CM    1. Chronic left shoulder pain  M25.512     G89.29       2. Adhesive capsulitis of left shoulder  M75.02                      Subjective: Patient feeling a little more stiff and achy after not performing HEP for 2 days secondary to being sick. Objective: See treatment diary below    Assessment: Pt demonstrated improved ABD PROM. Plan: Continue per plan of care. Precautions: none.     Dx: L FS Dec 2022    Manuals 10/19 10/24 10/26          L sh PROM 10 min 10 min 10 min          L GH Gr IV mobs 1x40  1x40          Laser L sh 4000J  4000J 4000J          L IR MWM 1x7            Neuro Re-Ed                                                                                                        Ther Ex             Pulleys flex 10x10" 10x10" 10"x10          Pulleys ABD 10x10" 10x10" 10"x10          AAROM ER 10x10" 10x10" 10"x10          Standing mod sleeper stretch 10"x5 10"x5 10"x5          Standing IR with OP 10x5" 10x5" 10"x5          AROM flex 1x10 1x10 1x10          AROM ABD 1x10 1x10 1x10          AROM  IR 1x10 1x10 1x10          AROM ER 1x10 1x10 1x10          Ther Activity                                       Gait Training                                       Modalities

## 2023-10-31 ENCOUNTER — OFFICE VISIT (OUTPATIENT)
Dept: PHYSICAL THERAPY | Facility: CLINIC | Age: 53
End: 2023-10-31
Payer: COMMERCIAL

## 2023-10-31 DIAGNOSIS — M25.512 CHRONIC LEFT SHOULDER PAIN: Primary | ICD-10-CM

## 2023-10-31 DIAGNOSIS — M75.02 ADHESIVE CAPSULITIS OF LEFT SHOULDER: ICD-10-CM

## 2023-10-31 DIAGNOSIS — G89.29 CHRONIC LEFT SHOULDER PAIN: Primary | ICD-10-CM

## 2023-10-31 PROCEDURE — 97110 THERAPEUTIC EXERCISES: CPT | Performed by: PHYSICAL THERAPIST

## 2023-11-01 NOTE — PROGRESS NOTES
Daily Note     Today's date: 2023  Patient name: Asa Benítez  : 1970  MRN: [id number]  Referring provider: Marvel Esteban  Dx:   Encounter Diagnosis     ICD-10-CM    1. Chronic left shoulder pain  M25.512     G89.29       2. Adhesive capsulitis of left shoulder  M75.02                      Subjective: Patient reports functional restrictions with overhead lifting at home. Objective: See treatment diary below    Assessment: Pt demonstrated improved ABD PROM. Plan: Continue per plan of care. Precautions: none.     Dx: L FS Dec 2022    Manuals 10/19 10/24 10/26 10/31         L sh PROM 10 min 10 min 10 min 10 min         L GH Gr IV mobs 1x40  1x40          Laser L sh 4000J  4000J 4000J 4000J         L IR MWM 1x7            Neuro Re-Ed                                                                                                        Ther Ex             Pulleys flex 10x10" 10x10" 10"x10 10"x10         Pulleys ABD 10x10" 10x10" 10"x10 10"x10         AAROM ER 10x10" 10x10" 10"x10 10"x10         Standing mod sleeper stretch 10"x5 10"x5 10"x5 10"x5         Standing IR with OP 10x5" 10x5" 10"x5          AROM flex 1x10 1x10 1x10 1x10         AROM ABD 1x10 1x10 1x10 1x10         AROM  IR 1x10 1x10 1x10 1x10         AROM ER 1x10 1x10 1x10 1x10         Ther Activity                                       Gait Training                                       Modalities

## 2023-11-02 ENCOUNTER — OFFICE VISIT (OUTPATIENT)
Dept: PHYSICAL THERAPY | Facility: CLINIC | Age: 53
End: 2023-11-02
Payer: COMMERCIAL

## 2023-11-02 DIAGNOSIS — M25.512 CHRONIC LEFT SHOULDER PAIN: Primary | ICD-10-CM

## 2023-11-02 DIAGNOSIS — M75.02 ADHESIVE CAPSULITIS OF LEFT SHOULDER: ICD-10-CM

## 2023-11-02 DIAGNOSIS — G89.29 CHRONIC LEFT SHOULDER PAIN: Primary | ICD-10-CM

## 2023-11-02 PROCEDURE — 97110 THERAPEUTIC EXERCISES: CPT | Performed by: PHYSICAL MEDICINE & REHABILITATION

## 2023-11-02 NOTE — PROGRESS NOTES
Daily Note     Today's date: 2023  Patient name: Katy Martino  : 1970  MRN: [id number]  Referring provider: Donald Jacques  Dx:   Encounter Diagnosis     ICD-10-CM    1. Chronic left shoulder pain  M25.512     G89.29       2. Adhesive capsulitis of left shoulder  M75.02                      Subjective: Patient reports overall continued improvement, persistent functional IR restriction. Objective: See treatment diary below    Assessment: Good tolerance to intervention as charted. Greatest passive limitation in IR, responds well to manual therapy. Continue as able nv. Plan: Continue per plan of care. Precautions: none.     Dx: L FS Dec 2022    Manuals 10/19 10/24 10/26 10/31 11/2        L sh PROM 10 min 10 min 10 min 10 min LH        L GH Gr IV mobs 1x40  1x40  LH        Laser L sh 4000J  4000J 4000J 4000J 4000J        L IR MWM 1x7            Neuro Re-Ed                                                                                                        Ther Ex             Pulleys flex 10x10" 10x10" 10"x10 10"x10 10x10"        Pulleys ABD 10x10" 10x10" 10"x10 10"x10 10x10"        AAROM ER 10x10" 10x10" 10"x10 10"x10 10x10"        Standing mod sleeper stretch 10"x5 10"x5 10"x5 10"x5 5x10"        Standing IR with OP 10x5" 10x5" 10"x5          AROM flex 1x10 1x10 1x10 1x10 1x12        AROM ABD 1x10 1x10 1x10 1x10 1x12        AROM  IR 1x10 1x10 1x10 1x10 1x12        AROM ER 1x10 1x10 1x10 1x10 1x12        Ther Activity                                       Gait Training                                       Modalities

## 2023-11-06 ENCOUNTER — OFFICE VISIT (OUTPATIENT)
Dept: PHYSICAL THERAPY | Facility: CLINIC | Age: 53
End: 2023-11-06
Payer: COMMERCIAL

## 2023-11-06 DIAGNOSIS — M25.512 CHRONIC LEFT SHOULDER PAIN: Primary | ICD-10-CM

## 2023-11-06 DIAGNOSIS — G89.29 CHRONIC LEFT SHOULDER PAIN: Primary | ICD-10-CM

## 2023-11-06 DIAGNOSIS — M75.02 ADHESIVE CAPSULITIS OF LEFT SHOULDER: ICD-10-CM

## 2023-11-06 DIAGNOSIS — E78.5 MILD HYPERLIPIDEMIA: ICD-10-CM

## 2023-11-06 PROCEDURE — 97110 THERAPEUTIC EXERCISES: CPT

## 2023-11-06 RX ORDER — ROSUVASTATIN CALCIUM 10 MG/1
10 TABLET, COATED ORAL DAILY
Qty: 90 TABLET | Refills: 1 | Status: SHIPPED | OUTPATIENT
Start: 2023-11-06

## 2023-11-06 NOTE — TELEPHONE ENCOUNTER
Reason for call:   [x] Refill   [] Prior Auth  [] Other:     Office:   [x] PCP/Provider - David Kinney DO   [] Specialty/Provider -     Medication: rosuvastatin (CRESTOR)     Dose/Frequency:     10 mg, Oral, Daily       Quantity: 90    Pharmacy: [address] [place] [address] [address]     Does the patient have enough for 3 days?    [x] Yes   [] No - Send as HP to POD

## 2023-11-06 NOTE — PROGRESS NOTES
Daily Note     Today's date: 2023  Patient name: Jackie Wynn  : 1970  MRN: [id number]  Referring provider: Anastasiya Joseph  Dx:   Encounter Diagnosis     ICD-10-CM    1. Chronic left shoulder pain  M25.512     G89.29       2. Adhesive capsulitis of left shoulder  M75.02                      Subjective: Patient reports her pain and motion is much better but she still has a hard time with reaching behind her back. Objective: See treatment diary below    Assessment: Pt demonstrated minor ROM restrictions with shoulder elevation, moderate IR PROM restriction. Plan: Continue per plan of care. Precautions: none.     Dx: L FS Dec 2022    Manuals 10/19 10/24 10/26 10/31 11/2 11/6       L sh PROM 10 min 10 min 10 min 10 min LH 10 min       L GH Gr IV mobs 1x40  1x40  LH        Laser L sh 4000J  4000J 4000J 4000J 4000J 4000J       L IR MWM 1x7            Neuro Re-Ed                                                                                                        Ther Ex             Pulleys flex 10x10" 10x10" 10"x10 10"x10 10x10" 10x10"       Pulleys ABD 10x10" 10x10" 10"x10 10"x10 10x10" 10x10"       AAROM ER 10x10" 10x10" 10"x10 10"x10 10x10" 10x10"       Standing mod sleeper stretch 10"x5 10"x5 10"x5 10"x5 5x10" 5x10"       Standing IR with OP 10x5" 10x5" 10"x5          AROM flex 1x10 1x10 1x10 1x10 1x12 1x12       AROM ABD 1x10 1x10 1x10 1x10 1x12 1x12       AROM  IR 1x10 1x10 1x10 1x10 1x12 1x12       AROM ER 1x10 1x10 1x10 1x10 1x12 1x12       Ther Activity                                       Gait Training                                       Modalities

## 2023-11-08 ENCOUNTER — TELEMEDICINE (OUTPATIENT)
Dept: FAMILY MEDICINE CLINIC | Facility: CLINIC | Age: 53
End: 2023-11-08
Payer: COMMERCIAL

## 2023-11-08 DIAGNOSIS — U07.1 COVID-19: Primary | ICD-10-CM

## 2023-11-08 PROCEDURE — 99213 OFFICE O/P EST LOW 20 MIN: CPT | Performed by: NURSE PRACTITIONER

## 2023-11-08 RX ORDER — NIRMATRELVIR AND RITONAVIR 300-100 MG
3 KIT ORAL 2 TIMES DAILY
Qty: 30 TABLET | Refills: 0 | Status: SHIPPED | OUTPATIENT
Start: 2023-11-08 | End: 2023-11-13

## 2023-11-08 NOTE — PROGRESS NOTES
COVID-19 Outpatient Progress Note    Assessment/Plan:    Problem List Items Addressed This Visit    None  Visit Diagnoses       COVID-19    -  Primary    Relevant Medications    nirmatrelvir & ritonavir (Paxlovid, 300/100,) tablet therapy pack    Other Relevant Orders    COVID Only- Lab Collect           Disposition:     Patient meets criteria for Paxlovid and they have been counseled appropriately regarding risks, benefits, side effects, and alternative treatment options. After discussion, patient agrees to treatment. Possible side effects of Paxlovid? Possible side effects of Paxlovid are:  - Liver Problems. Notify us right away if you start to experience loss of appetite, yellowing of your skin and the whites of eyes (jaundice), dark-colored urine, pale colored stools and itchy skin, stomach area (abdominal) pain. - Resistance to HIV Medicines. If you have untreated HIV infection, Paxlovid may lead to some HIV medicines not working as well in the future. - Other possible side effects include: altered sense of taste, diarrhea, high blood pressure, or muscle aches. I have spent a total time of 15 minutes on the day of the encounter for this patient including discussing diagnostic results, discussing prognosis, risks and benefits of treatment options, instructions for management, patient and family education, importance of treatment compliance, risk factor reductions, impressions, counseling/coordination of care, documenting in the medical record, reviewing/ordering tests, medicine, procedures and obtaining or reviewing history. [facility]    Your healthcare provider and/or public health staff have evaluated you and have determined that you do not need to remain in the hospital at this time. At this time you can be isolated at home where you will be monitored by staff from your local or state health department.  You should carefully follow the prevention and isolation steps below until a healthcare provider or local or state health department says that you can return to your normal activities. Stay home except to get medical care    People who are mildly ill with COVID-19 are able to isolate at home during their illness. You should restrict activities outside your home, except for getting medical care. Do not go to work, school, or public areas. Avoid using public transportation, ride-sharing, or taxis. Separate yourself from other people and animals in your home    People: As much as possible, you should stay in a specific room and away from other people in your home. Also, you should use a separate bathroom, if available. Animals: You should restrict contact with pets and other animals while you are sick with COVID-19, just like you would around other people. Although there have not been reports of pets or other animals becoming sick with COVID-19, it is still recommended that people sick with COVID-19 limit contact with animals until more information is known about the virus. When possible, have another member of your household care for your animals while you are sick. If you are sick with COVID-19, avoid contact with your pet, including petting, snuggling, being kissed or licked, and sharing food. If you must care for your pet or be around animals while you are sick, wash your hands before and after you interact with pets and wear a facemask. See COVID-19 and Animals for more information. Call ahead before visiting your doctor    If you have a medical appointment, call the healthcare provider and tell them that you have or may have COVID-19. This will help the healthcare provider’s office take steps to keep other people from getting infected or exposed. Wear a facemask    You should wear a facemask when you are around other people (e.g., sharing a room or vehicle) or pets and before you enter a healthcare provider’s office.  If you are not able to wear a facemask (for example, because it causes trouble breathing), then people who live with you should not stay in the same room with you, or they should wear a facemask if they enter your room. Cover your coughs and sneezes    Cover your mouth and nose with a tissue when you cough or sneeze. Throw used tissues in a lined trash can. Immediately wash your hands with soap and water for at least 20 seconds or, if soap and water are not available, clean your hands with an alcohol-based hand  that contains at least 60% alcohol. Clean your hands often    Wash your hands often with soap and water for at least 20 seconds, especially after blowing your nose, coughing, or sneezing; going to the bathroom; and before eating or preparing food. If soap and water are not readily available, use an alcohol-based hand  with at least 60% alcohol, covering all surfaces of your hands and rubbing them together until they feel dry. Soap and water are the best option if hands are visibly dirty. Avoid touching your eyes, nose, and mouth with unwashed hands. Avoid sharing personal household items    You should not share dishes, drinking glasses, cups, eating utensils, towels, or bedding with other people or pets in your home. After using these items, they should be washed thoroughly with soap and water. Clean all “high-touch” surfaces everyday    High touch surfaces include counters, tabletops, doorknobs, bathroom fixtures, toilets, phones, keyboards, tablets, and bedside tables. Also, clean any surfaces that may have blood, stool, or body fluids on them. Use a household cleaning spray or wipe, according to the label instructions. Labels contain instructions for safe and effective use of the cleaning product including precautions you should take when applying the product, such as wearing gloves and making sure you have good ventilation during use of the product.     Monitor your symptoms    Seek prompt medical attention if your illness is worsening (e.g., difficulty breathing). Before seeking care, call your healthcare provider and tell them that you have, or are being evaluated for, COVID-19. Put on a facemask before you enter the facility. These steps will help the healthcare provider’s office to keep other people in the office or waiting room from getting infected or exposed. Ask your healthcare provider to call the local or state health department. Persons who are placed under active monitoring or facilitated self-monitoring should follow instructions provided by their local health department or occupational health professionals, as appropriate. If you have a medical emergency and need to call 911, notify the dispatch personnel that you have, or are being evaluated for COVID-19. If possible, put on a facemask before emergency medical services arrive. Discontinuing home isolation    Patients with confirmed COVID-19 should remain under home isolation precautions until the following conditions are met: They have had no fever for at least 24 hours (that is one full day of no fever without the use medicine that reduces fevers)  AND  other symptoms have improved (for example, when their cough or shortness of breath have improved)  AND  If had mild or moderate illness, at least 10 days have passed since their symptoms first appeared or if severe illness (needed oxygen) or immunosuppressed, at least 20 days have passed since symptoms first appeared  Patients with confirmed COVID-19 should also notify close contacts (including their workplace) and ask that they self-quarantine. Currently, close contact is defined as being within 6 feet for 15 minutes or more from the period 24 hours starting 48 hours before symptom onset to the time at which the patient went into isolation.   Close contacts of patients diagnosed with COVID-19 should be instructed by the patient to self-quarantine for 14 days from the last time of their last contact with the patient. Source: [employer].     Encounter provider: BROOKE Chu     Provider located at: [address] [address]     Recent Visits  No visits were found meeting these conditions. Showing recent visits within past 7 days and meeting all other requirements  Today's Visits  Date Type Provider Dept   11/08/23 Telemedicine BROOKE Chu [facility]   Showing today's visits and meeting all other requirements  Future Appointments  No visits were found meeting these conditions. Showing future appointments within next 150 days and meeting all other requirements     This virtual check-in was done via [address] [place] [address] and patient was informed that this is a secure, HIPAA-compliant platform. She agrees to proceed. Patient agrees to participate in a virtual check in via telephone or video visit instead of presenting to the office to address urgent/immediate medical needs. Patient is aware this is a billable service. She acknowledged consent and understanding of privacy and security of the video platform. The patient has agreed to participate and understands they can discontinue the visit at any time. After connecting through [facility], the patient was identified by name and date of birth. Irais Canseco was informed that this was a telemedicine visit and that the exam was being conducted confidentially over secure lines. My office door was closed. No one else was in the room. Irais Canseco acknowledged consent and understanding of privacy and security of the telemedicine visit. I informed the patient that I have reviewed her record in Epic and presented the opportunity for her to ask any questions regarding the visit today. The patient agreed to participate.      Verification of patient location:  Patient is located in the following state in which I hold an active license: [place]    Subjective:   Min Nicolas is a 46 y.o. female who is concerned about COVID-19. Patient's symptoms include fatigue, nasal congestion, rhinorrhea, sore throat, cough and myalgias. Patient denies fever, chills, malaise, anosmia, loss of taste, shortness of breath, chest tightness, abdominal pain, nausea, vomiting, diarrhea and headaches. - Date of symptom onset: 11/6/2023      COVID-19 vaccination status: Fully vaccinated (primary series)    Exposure:   Contact with a person who is under investigation (PUI) for or who is positive for COVID-19 within the last 14 days?: Yes    Hospitalized recently for fever and/or lower respiratory symptoms?: No      Currently a healthcare worker that is involved in direct patient care?: No      Works in a special setting where the risk of COVID-19 transmission may be high? (this may include long-term care, correctional and long term facilities; homeless shelters; assisted-living facilities and group homes.): No      Resident in a special setting where the risk of COVID-19 transmission may be high? (this may include long-term care, correctional and long term facilities; homeless shelters; assisted-living facilities and group homes.): No      Sore throat Monday  Yesterday sore throat pnd  As day progressed and worsened and tested yesterday was negative  This am felt bad and tested positive      Lab Results   Component Value Date    SARSCOV2 Positive (A) 09/20/2021       Review of Systems   Constitutional:  Positive for fatigue. Negative for chills and fever. HENT:  Positive for congestion, postnasal drip, rhinorrhea and sore throat. Negative for ear pain. Respiratory:  Positive for cough. Negative for chest tightness and shortness of breath. Cardiovascular:  Negative for chest pain and palpitations. Gastrointestinal:  Negative for abdominal pain, diarrhea, nausea and vomiting. Musculoskeletal:  Positive for myalgias. Negative for arthralgias.    Skin: Negative for rash. Neurological:  Negative for dizziness, light-headedness and headaches. Hematological:  Negative for adenopathy. Psychiatric/Behavioral:  Negative for dysphoric mood and sleep disturbance. The patient is not nervous/anxious. Current Outpatient Medications on File Prior to Visit   Medication Sig    cholecalciferol (VITAMIN D3) 1,000 units tablet Take 1 tablet by mouth daily    Omega-3 Fatty Acids (FISH OIL PO) Take by mouth Taking 1250 mg    rosuvastatin (CRESTOR) 10 MG tablet Take 1 tablet (10 mg total) by mouth daily    Ascorbic Acid (vitamin C) 1000 MG tablet Take 1,000 mg by mouth daily (Patient not taking: Reported on 11/8/2023)       Objective:    LMP 10/16/2021 Comment: denies preg       Physical Exam  Vitals and nursing note reviewed. Constitutional:       Appearance: Normal appearance. HENT:      Head: Normocephalic and atraumatic. Eyes:      Conjunctiva/sclera: Conjunctivae normal.   Pulmonary:      Effort: Pulmonary effort is normal.   Musculoskeletal:         General: Normal range of motion. Cervical back: Normal range of motion. Neurological:      General: No focal deficit present. Mental Status: She is alert and oriented to person, place, and time.    Psychiatric:         Mood and Affect: Mood normal.         Behavior: Behavior normal.       BROOKE Cuevas

## 2023-11-09 ENCOUNTER — TELEPHONE (OUTPATIENT)
Age: 53
End: 2023-11-09

## 2023-11-09 ENCOUNTER — APPOINTMENT (OUTPATIENT)
Dept: LAB | Age: 53
End: 2023-11-09
Payer: COMMERCIAL

## 2023-11-09 DIAGNOSIS — U07.1 COVID-19: Primary | ICD-10-CM

## 2023-11-09 PROCEDURE — 87635 SARS-COV-2 COVID-19 AMP PRB: CPT | Performed by: NURSE PRACTITIONER

## 2023-11-09 NOTE — TELEPHONE ENCOUNTER
I called patient to inform her that he did put an order in for her covid swab to get done at [facility] but pt informed me that she did just end up going to  to get the lab drawn.

## 2023-11-09 NOTE — TELEPHONE ENCOUNTER
I resubmitted a COVID swab order. None of them are specific to [facility].  Please let me know if there is something specific I should do. Thank you.

## 2023-11-09 NOTE — TELEPHONE ENCOUNTER
Avelina from [facility] called to see if we could order the Covid swab for [facility] as the pt is there to have it done. I advised Avelina that I would request a lab change for the order if possible and for her to let the pt know she can go to any  lab to be swabbed.

## 2023-11-10 LAB — SARS-COV-2 RNA RESP QL NAA+PROBE: POSITIVE

## 2023-11-14 ENCOUNTER — OFFICE VISIT (OUTPATIENT)
Dept: PHYSICAL THERAPY | Facility: CLINIC | Age: 53
End: 2023-11-14
Payer: COMMERCIAL

## 2023-11-14 DIAGNOSIS — M75.02 ADHESIVE CAPSULITIS OF LEFT SHOULDER: ICD-10-CM

## 2023-11-14 DIAGNOSIS — G89.29 CHRONIC LEFT SHOULDER PAIN: Primary | ICD-10-CM

## 2023-11-14 DIAGNOSIS — M25.512 CHRONIC LEFT SHOULDER PAIN: Primary | ICD-10-CM

## 2023-11-14 PROCEDURE — 97110 THERAPEUTIC EXERCISES: CPT

## 2023-11-14 PROCEDURE — 97140 MANUAL THERAPY 1/> REGIONS: CPT

## 2023-11-14 NOTE — PROGRESS NOTES
Daily Note     Today's date: 2023  Patient name: Jessie Siegel  : 1970  MRN: [id number]  Referring provider: Marilin Alfaro  Dx:   Encounter Diagnosis     ICD-10-CM    1. Chronic left shoulder pain  M25.512     G89.29       2. Adhesive capsulitis of left shoulder  M75.02           Start Time: 1630  Stop Time: 1708  Total time in clinic (min): 38 minutes    Subjective: Patient reports reaching behind her back is the most difficult. Objective: See treatment diary below    Assessment: Patient demonstrates good AROM FF and abduction with no UT compensation noted. She has limited IR > ER passively which does improve post manual stretching. AROM IR still slightly painful. Plan: Continue per plan of care. Precautions: none.     Dx: L FS Dec 2022    Manuals 10/19 10/24 10/26 10/31 11/2 11/6 11/14      L sh PROM 10 min 10 min 10 min 10 min LH 10 min 10 min      L GH Gr IV mobs 1x40  1x40  LH        Laser L sh 4000J  4000J 4000J 4000J 4000J 4000J 4000J      L IR MWM 1x7            Neuro Re-Ed                                                                                                        Ther Ex             Pulleys flex 10x10" 10x10" 10"x10 10"x10 10x10" 10x10" 10x10"      Pulleys ABD 10x10" 10x10" 10"x10 10"x10 10x10" 10x10" 10x10"      AAROM ER 10x10" 10x10" 10"x10 10"x10 10x10" 10x10" 10x10"      Standing mod sleeper stretch 10"x5 10"x5 10"x5 10"x5 5x10" 5x10" 5x10"      Standing IR with OP 10x5" 10x5" 10"x5          AROM flex 1x10 1x10 1x10 1x10 1x12 1x12 1x12      AROM ABD 1x10 1x10 1x10 1x10 1x12 1x12 1x12      AROM  IR 1x10 1x10 1x10 1x10 1x12 1x12 1x12      AROM ER 1x10 1x10 1x10 1x10 1x12 1x12 1x12      Ther Activity                                       Gait Training                                       Modalities

## 2023-11-21 ENCOUNTER — OFFICE VISIT (OUTPATIENT)
Dept: OBGYN CLINIC | Facility: CLINIC | Age: 53
End: 2023-11-21
Payer: COMMERCIAL

## 2023-11-21 DIAGNOSIS — N94.10 DYSPAREUNIA IN FEMALE: ICD-10-CM

## 2023-11-21 DIAGNOSIS — N92.6 IRREGULAR MENSES: ICD-10-CM

## 2023-11-21 DIAGNOSIS — N95.1 MENOPAUSAL SYMPTOMS: Primary | ICD-10-CM

## 2023-11-21 PROBLEM — Z00.00 ANNUAL PHYSICAL EXAM: Status: RESOLVED | Noted: 2021-01-07 | Resolved: 2023-11-21

## 2023-11-21 PROBLEM — Z00.00 WELL ADULT EXAM: Status: RESOLVED | Noted: 2018-01-29 | Resolved: 2023-11-21

## 2023-11-21 PROCEDURE — 99215 OFFICE O/P EST HI 40 MIN: CPT | Performed by: OBSTETRICS & GYNECOLOGY

## 2023-11-21 RX ORDER — PROGESTERONE 100 MG/1
100 CAPSULE ORAL
Qty: 30 CAPSULE | Refills: 11 | Status: SHIPPED | OUTPATIENT
Start: 2023-11-21

## 2023-11-21 RX ORDER — ESTRADIOL 1 MG/1
1 TABLET ORAL DAILY
Qty: 30 TABLET | Refills: 11 | Status: SHIPPED | OUTPATIENT
Start: 2023-11-21

## 2023-11-21 RX ORDER — ESTRADIOL 0.1 MG/G
1 CREAM VAGINAL 2 TIMES WEEKLY
Qty: 42.5 G | Status: SHIPPED | OUTPATIENT
Start: 2023-11-23

## 2023-11-24 ENCOUNTER — OFFICE VISIT (OUTPATIENT)
Dept: PHYSICAL THERAPY | Facility: CLINIC | Age: 53
End: 2023-11-24
Payer: COMMERCIAL

## 2023-11-24 DIAGNOSIS — G89.29 CHRONIC LEFT SHOULDER PAIN: Primary | ICD-10-CM

## 2023-11-24 DIAGNOSIS — M75.02 ADHESIVE CAPSULITIS OF LEFT SHOULDER: ICD-10-CM

## 2023-11-24 DIAGNOSIS — M25.512 CHRONIC LEFT SHOULDER PAIN: Primary | ICD-10-CM

## 2023-11-24 PROBLEM — Z86.16 HISTORY OF COVID-19: Status: RESOLVED | Noted: 2021-09-22 | Resolved: 2023-11-24

## 2023-11-24 PROCEDURE — 97140 MANUAL THERAPY 1/> REGIONS: CPT

## 2023-11-24 PROCEDURE — 97110 THERAPEUTIC EXERCISES: CPT

## 2023-11-24 NOTE — PROGRESS NOTES
Assessment/Plan:       Diagnoses and all orders for this visit:    Menopausal symptoms  -     estradiol (Estrace) 1 mg tablet; Take 1 tablet (1 mg total) by mouth daily  -     Progesterone 100 MG CAPS; Take 100 mg by mouth at bedtime    Irregular menses  -     Ambulatory Referral to Obstetrics / Gynecology    Dyspareunia in female  -     estradiol (ESTRACE VAGINAL) 0.1 mg/g vaginal cream; Insert 1 g into the vagina 2 (two) times a week        1)  Discussed the wide and varied hormonal fluctuations associated with the perimenopausal time frame, often resulting in estrogen dominance and relative progesterone deficiency. This results in menorrhagia, uterine cellular growth with fibroids, endometriosis, breast density along with progesterone loss symptoms including sleep and anxiety or mood. She is still a candidate for hormone therapy! 2) Controversies surrounding estrogen based HRT discussed, including the fear based off the [facility] trials concerning Prempro. I will not be prescribing Prempro. One should not assume all therapies confer the same risk or benefit. Stay tuned to the [place]PLEN[place] trials for E2/PG therapy in the [facility] with Biju[facility], but European trials with same medication point to safety and efficacy with superior health outcomes with women on this type of HRT. 3) I discussed the long term health benefits of E2/PG, including: reduction of CVD risk, reduction of hyperlipidemia, reduction of DM and GLP-1 agonist activity with mild appetite suppression; reduction of colon CA, osteoporosis and cognitive decline, Alzheimer's disease. 4) She would like to try HRT. Will start with oral estradiol 1 mg/ 100 mg progesterone . Side effects of HRT reviewed including vaginal bleeding, breast tenderness and somnolence, easily remedied with dose adjustment. 5) She needs a gyn exam to exclude other pathology as well as normal screening maintenance.  Will schedule  6) Email me with progress report in 1 month, reminding her that she may have a couple more episodes of spotting before menses actually subside. Follow up prn    This was a 50 minute visit with greater than 50% of time spent in face to face counseling and coordination of care. Subjective:      Patient ID: Alka Kinney is a 46 y.o. female. Jori Greenfield presents for hormonal consult, referred by Dr. Ronn Mcclendon. Sees NAHOMI Baez with [facility] for gyn care. Jori Greenfield is perimenopausal  with periods every 10 months. Complains of:  1) Dyspareunia, no gyn evaluation or pelvic exam in several years! Does not necessarily feel dry? 2) Hot flashes, intermittent, but night flashes worse, often affecting sleep  PMHX: hyperlipidemia  SHX: denies tobacco, ETOH  FHX: Mom alive, lipid, Afib, thyroid issues. MGM DM2; MGF HTN. Dad alive, s/p lymphoma, DM2, now with dementia. PGM DM, HTN, stroke. PGF unknown. 2 brothers, healthy. 2 sons, 31/21, healthy. Review of Systems   Constitutional: Negative. Gastrointestinal: Negative. Endocrine: Positive for heat intolerance. Genitourinary:  Positive for dyspareunia and menstrual problem. Musculoskeletal: Negative. Psychiatric/Behavioral:  Positive for sleep disturbance. Objective:      LMP 10/16/2021 Comment: denies preg         Physical Exam  Constitutional:       Appearance: Normal appearance. Neurological:      Mental Status: She is alert.

## 2023-11-24 NOTE — PROGRESS NOTES
Daily Note     Today's date: 2023  Patient name: Gabino Markham  : 1970  MRN: [id number]  Referring provider: Lani Haro  Dx:   Encounter Diagnosis     ICD-10-CM    1. Chronic left shoulder pain  M25.512     G89.29       2. Adhesive capsulitis of left shoulder  M75.02           Start Time: 0900  Stop Time: 0940  Total time in clinic (min): 40 minutes    Subjective: Patient reports, "I can reach a little further behind my back. The overhead reaching is good but is weak". Objective: See treatment diary below    Assessment: Patient demonstrates close to full PROM FF and ABD. She has IR > ER limitations during PROM but it is improving well. Plan: On hold until her F/U with ortho next week to determine POC. Precautions: none.     Dx: L FS Dec 2022    Manuals 10/19 10/24 10/26 10/31 11/2 11/6 11/14 11/24     L sh PROM 10 min 10 min 10 min 10 min LH 10 min 10 min 10 min     L GH Gr IV mobs 1x40  1x40  LH        Laser L sh 4000J  4000J 4000J 4000J 4000J 4000J 4000J 4000J     L IR MWM 1x7            Neuro Re-Ed                                                                                                        Ther Ex             Pulleys flex 10x10" 10x10" 10"x10 10"x10 10x10" 10x10" 10x10" 10x10"     Pulleys ABD 10x10" 10x10" 10"x10 10"x10 10x10" 10x10" 10x10" 10x10"     AAROM ER 10x10" 10x10" 10"x10 10"x10 10x10" 10x10" 10x10" 10x10"     Standing mod sleeper stretch 10"x5 10"x5 10"x5 10"x5 5x10" 5x10" 5x10" 5x10"     Standing IR with OP 10x5" 10x5" 10"x5          AROM flex 1x10 1x10 1x10 1x10 1x12 1x12 1x12 2x10     AROM ABD 1x10 1x10 1x10 1x10 1x12 1x12 1x12 2x10     AROM  IR 1x10 1x10 1x10 1x10 1x12 1x12 1x12 2x10     AROM ER 1x10 1x10 1x10 1x10 1x12 1x12 1x12 2x10     Ther Activity                                       Gait Training                                       Modalities

## 2023-11-24 NOTE — PROGRESS NOTES
Cj Duran presents for hormonal consult, referred by Dr. Madhavi Lomeli. Sees NAHOMI Baez with [facility] for gyn care. Cj Duran is perimenopausal  with periods every 10 months. Complains of:  1) Dyspareunia, no gyn evaluation or pelvic exam in several years! Does not necessarily feel dry? 2) Hot flashes, intermittent, but night flashes worse, often affecting sleep  PMHX: hyperlipidemia  SHX: denies tobacco, ETOH  FHX: Mom alive, lipid, Afib, thyroid issues. MGM DM2; MGF HTN. Dad alive, s/p lymphoma, DM2, now with dementia. PGM DM, HTN, stroke. PGF unknown. 2 brothers, healthy. 2 sons, 31/21, healthy.

## 2023-11-30 ENCOUNTER — OFFICE VISIT (OUTPATIENT)
Dept: OBGYN CLINIC | Facility: OTHER | Age: 53
End: 2023-11-30
Payer: COMMERCIAL

## 2023-11-30 VITALS
HEIGHT: 62 IN | HEART RATE: 93 BPM | SYSTOLIC BLOOD PRESSURE: 108 MMHG | BODY MASS INDEX: 27.06 KG/M2 | DIASTOLIC BLOOD PRESSURE: 74 MMHG | WEIGHT: 147.05 LBS

## 2023-11-30 DIAGNOSIS — M75.02 ADHESIVE CAPSULITIS OF LEFT SHOULDER: Primary | ICD-10-CM

## 2023-11-30 PROCEDURE — 99213 OFFICE O/P EST LOW 20 MIN: CPT | Performed by: ORTHOPAEDIC SURGERY

## 2023-11-30 NOTE — PROGRESS NOTES
Assessment  Diagnoses and all orders for this visit:    Adhesive capsulitis of left shoulder, resolved    Discussion and Plan:    Patient has regained nearly full ROM of her left shoulder since her last visit. Although, she still c/o intermittent pain and subjective weakness about the arm. Explained to the patient that her examination is not worrisome for a rotator cuff pathology as she has good rotator cuff strength during testing. She was instructed to continue with her home exercise program on a daily basis. If her symptoms persist and do not improve with continuation of home exercise program, then we could consider an RMI of the left shoulder to further evaluate for a possible rotator cuff pathology  Follow up on an as needed basis    Subjective:   Patient ID: Fanny Harrington is a 46 y.o. female presents today for a follow up visit for her left shoulder. She has been treating for adhesive capsulitis. Today, she states that her ROM has improved greatly since her last visit. She states that she still remains limited with IR but notes improvements. She still reports intermittent "dull, aching" pain about the shoulder but this also continues to improve. She continues to perform HEP as instructed with continued benefit. No numbness or tingling. No fevers or chills.          The following portions of the patient's history were reviewed and updated as appropriate: allergies, current medications, past family history, past medical history, past social history, past surgical history and problem list      Objective:  /74   Pulse 93   Ht 5' 1.5" (1.562 m)   Wt 66.7 kg (147 lb 0.8 oz)   LMP 10/16/2021 Comment: denies preg  BMI 27.33 kg/m²       Left Shoulder Exam     Range of Motion   External rotation:  50   Forward flexion:  180   Internal rotation 0 degrees:  L1     Muscle Strength   Abduction: 5/5   External rotation: 5/5     Tests   Drop arm: negative    Other   Erythema: absent  Sensation: normal  Pulse: present             Physical Exam  Constitutional:       Appearance: She is well-developed. Eyes:      Pupils: Pupils are equal, round, and reactive to light. Pulmonary:      Effort: Pulmonary effort is normal.      Breath sounds: Normal breath sounds. Skin:     General: Skin is warm and dry. Neurological:      Mental Status: She is alert and oriented to person, place, and time. Psychiatric:         Behavior: Behavior normal.         Thought Content:  Thought content normal.         Judgment: Judgment normal.         Scribe Attestation      I,:  Belen Richards am acting as a scribe while in the presence of the attending physician.:       I,:  Raina Delgadillo MD personally performed the services described in this documentation    as scribed in my presence.:

## 2024-01-10 ENCOUNTER — RA CDI HCC (OUTPATIENT)
Dept: OTHER | Facility: HOSPITAL | Age: 54
End: 2024-01-10

## 2024-01-10 NOTE — PROGRESS NOTES
HCC coding opportunities       Chart reviewed, no opportunity found: CHART REVIEWED, NO OPPORTUNITY FOUND        Patients Insurance        Commercial Insurance: [employer]

## 2024-01-10 NOTE — PROGRESS NOTES
This encounter was created in error - please disregard.HCC coding opportunities       Chart reviewed, no opportunity found: CHART REVIEWED, NO OPPORTUNITY FOUND        Patients Insurance        Commercial Insurance: [employer]

## 2024-01-15 LAB
ALBUMIN SERPL-MCNC: 4.4 G/DL (ref 3.6–5.1)
ALBUMIN/GLOB SERPL: 1.7 (CALC) (ref 1–2.5)
ALP SERPL-CCNC: 60 U/L (ref 37–153)
ALT SERPL-CCNC: 22 U/L (ref 6–29)
AST SERPL-CCNC: 20 U/L (ref 10–35)
BILIRUB SERPL-MCNC: 0.3 MG/DL (ref 0.2–1.2)
BUN SERPL-MCNC: 19 MG/DL (ref 7–25)
BUN/CREAT SERPL: NORMAL (CALC) (ref 6–22)
CALCIUM SERPL-MCNC: 9 MG/DL (ref 8.6–10.4)
CHLORIDE SERPL-SCNC: 103 MMOL/L (ref 98–110)
CHOLEST SERPL-MCNC: 239 MG/DL
CHOLEST/HDLC SERPL: 2.5 (CALC)
CO2 SERPL-SCNC: 27 MMOL/L (ref 20–32)
CREAT SERPL-MCNC: 0.92 MG/DL (ref 0.5–1.03)
GFR/BSA.PRED SERPLBLD CYS-BASED-ARV: 74 ML/MIN/1.73M2
GLOBULIN SER CALC-MCNC: 2.6 G/DL (CALC) (ref 1.9–3.7)
GLUCOSE SERPL-MCNC: 89 MG/DL (ref 65–99)
HDLC SERPL-MCNC: 94 MG/DL
LDLC SERPL CALC-MCNC: 127 MG/DL (CALC)
NONHDLC SERPL-MCNC: 145 MG/DL (CALC)
POTASSIUM SERPL-SCNC: 4.2 MMOL/L (ref 3.5–5.3)
PROT SERPL-MCNC: 7 G/DL (ref 6.1–8.1)
SODIUM SERPL-SCNC: 139 MMOL/L (ref 135–146)
TRIGL SERPL-MCNC: 79 MG/DL
TSH SERPL-ACNC: 1.73 MIU/L

## 2024-01-17 ENCOUNTER — OFFICE VISIT (OUTPATIENT)
Dept: FAMILY MEDICINE CLINIC | Facility: CLINIC | Age: 54
End: 2024-01-17
Payer: COMMERCIAL

## 2024-01-17 VITALS
HEART RATE: 73 BPM | SYSTOLIC BLOOD PRESSURE: 112 MMHG | RESPIRATION RATE: 14 BRPM | WEIGHT: 151.2 LBS | TEMPERATURE: 96.6 F | OXYGEN SATURATION: 98 % | BODY MASS INDEX: 27.82 KG/M2 | DIASTOLIC BLOOD PRESSURE: 80 MMHG | HEIGHT: 62 IN

## 2024-01-17 DIAGNOSIS — M75.02 ADHESIVE CAPSULITIS OF LEFT SHOULDER: ICD-10-CM

## 2024-01-17 DIAGNOSIS — E55.9 VITAMIN D DEFICIENCY: ICD-10-CM

## 2024-01-17 DIAGNOSIS — Z00.00 ANNUAL PHYSICAL EXAM: Primary | ICD-10-CM

## 2024-01-17 DIAGNOSIS — E78.5 MILD HYPERLIPIDEMIA: ICD-10-CM

## 2024-01-17 DIAGNOSIS — R74.8 ELEVATED LIVER ENZYMES: ICD-10-CM

## 2024-01-17 DIAGNOSIS — Z12.31 ENCOUNTER FOR SCREENING MAMMOGRAM FOR BREAST CANCER: ICD-10-CM

## 2024-01-17 PROBLEM — N92.6 IRREGULAR MENSES: Status: RESOLVED | Noted: 2017-10-31 | Resolved: 2024-01-17

## 2024-01-17 PROBLEM — N95.1 POST MENOPAUSAL SYNDROME: Status: ACTIVE | Noted: 2024-01-17

## 2024-01-17 PROCEDURE — 99396 PREV VISIT EST AGE 40-64: CPT | Performed by: FAMILY MEDICINE

## 2024-01-17 NOTE — PROGRESS NOTES
ADULT ANNUAL PHYSICAL  [facility]    NAME: Shilpa Saleh  AGE: 53 y.o. SEX: female  : 1970     DATE: 2024     Assessment and Plan:     Problem List Items Addressed This Visit     Mild hyperlipidemia    Relevant Orders    CBC    Comprehensive metabolic panel    Lipid Panel with Direct LDL reflex    TSH, 3rd generation with Free T4 reflex    Vitamin D deficiency    Relevant Orders    Vitamin D 25 hydroxy    Elevated liver enzymes     Mproved on last set of labs         Relevant Orders    Comprehensive metabolic panel    Adhesive capsulitis of left shoulder     Improved          Annual physical exam - Primary     Declines flu shot        Other Visit Diagnoses     Encounter for screening mammogram for breast cancer        Relevant Orders    Mammo screening bilateral w 3d & cad          Immunizations and preventive care screenings were discussed with patient today. Appropriate education was printed on patient's after visit summary.    Counseling:  Dental Health: discussed importance of regular tooth brushing, flossing, and dental visits.      Depression Screening and Follow-up Plan: Patient was screened for depression during today's encounter. They screened negative with a PHQ-2 score of 0.        Return in 6 months (on 2024) for Recheck.     Chief Complaint:     Chief Complaint   Patient presents with   • Physical Exam     Patient being seen for physical       History of Present Illness:     Adult Annual Physical   Patient here for a comprehensive physical exam. The patient reports no problems.    Diet and Physical Activity  Diet/Nutrition: well balanced diet.   Exercise: 1-2 times a week on average.      Depression Screening  PHQ-2/9 Depression Screening    Little interest or pleasure in doing things: 0 - not at all  Feeling down, depressed, or hopeless: 0 - not at all  PHQ-2 Score: 0  PHQ-2 Interpretation: Negative depression screen       General  Health  Sleep: sleeps well.   Hearing: normal - bilateral.  Vision: no vision problems.   Dental: regular dental visits.       /GYN Health  Follows with gynecology? no   Patient is: postmenopausal  Last menstrual period: 1 year ago  Contraceptive method: menopause.    Advanced Care Planning  Do you have an advanced directive? no  Do you have a durable medical power of ? no     Review of Systems:     Review of Systems   Constitutional: Negative.    HENT: Negative.     Eyes: Negative.    Respiratory: Negative.     Cardiovascular: Negative.    Gastrointestinal: Negative.    Endocrine: Negative.    Genitourinary: Negative.    Musculoskeletal: Negative.    Skin: Negative.    Allergic/Immunologic: Negative.    Neurological: Negative.    Hematological: Negative.    Psychiatric/Behavioral: Negative.        Past Medical History:     Past Medical History:   Diagnosis Date   • Abnormal blood chemistry    • Acute bilateral low back pain without sciatica 2020   • Allergic     Seasonal   • Chest pain     HX OF CHEST PAIN STRESS    • GERD (gastroesophageal reflux disease)     Occasionally   • H/O  section     hx of  delivery   • Headache(784.0)    • Hyperlipidemia    • Migraine    • Miscarriage    • Rh incompatibility    • Varicella 1979      Past Surgical History:     Past Surgical History:   Procedure Laterality Date   •  SECTION  1997   • CYSTECTOMY Right     Ovarian   • INDUCED       Surgical Treatment Of Spontaneous    • TOOTH EXTRACTION Bilateral    • TUBAL LIGATION      Midline      Social History:     Social History     Socioeconomic History   • Marital status: /Civil Union     Spouse name: None   • Number of children: None   • Years of education: None   • Highest education level: None   Occupational History   • None   Tobacco Use   • Smoking status: Never     Passive exposure: Never   • Smokeless tobacco: Never   Vaping Use   • Vaping status:  Never Used   Substance and Sexual Activity   • Alcohol use: Not Currently   • Drug use: Never   • Sexual activity: Yes     Partners: Male   Other Topics Concern   • None   Social History Narrative   • None     Social Determinants of Health     Financial Resource Strain: Not on file   Food Insecurity: Not on file   Transportation Needs: Not on file   Physical Activity: Not on file   Stress: Not on file   Social Connections: Not on file   Intimate Partner Violence: Not on file   Housing Stability: Not on file      Family History:     Family History   Problem Relation Age of Onset   • Coronary artery disease Mother    • Hypothyroidism Mother    • Hyperlipidemia Mother         Pure   • Migraines Mother    • Osteoporosis Mother    • Thyroid disease Mother    • Arthritis Mother    • Cancer Father    • Diabetes Father    • Colon cancer Father 70   • Dementia Father    • Diabetes Maternal Grandmother    • Diabetes Paternal Grandmother    • Stroke Paternal Grandmother    • No Known Problems Maternal Aunt    • Heart attack Maternal Grandfather    • No Known Problems Paternal Grandfather    • No Known Problems Son    • No Known Problems Son       Current Medications:     Current Outpatient Medications   Medication Sig Dispense Refill   • cholecalciferol (VITAMIN D3) 1,000 units tablet Take 1 tablet by mouth daily     • estradiol (ESTRACE VAGINAL) 0.1 mg/g vaginal cream Insert 1 g into the vagina 2 (two) times a week 42.5 g PRN   • estradiol (Estrace) 1 mg tablet Take 1 tablet (1 mg total) by mouth daily 30 tablet 11   • Omega-3 Fatty Acids (FISH OIL PO) Take by mouth Taking 1250 mg     • Progesterone 100 MG CAPS Take 100 mg by mouth at bedtime 30 capsule 11   • rosuvastatin (CRESTOR) 10 MG tablet Take 1 tablet (10 mg total) by mouth daily 90 tablet 1     No current facility-administered medications for this visit.      Allergies:     No Known Allergies   Physical Exam:     /80 (BP Location: Left arm, Patient Position:  "Sitting, Cuff Size: Standard)   Pulse 73   Temp (!) 96.6 °F (35.9 °C) (Tympanic)   Resp 14   Ht 5' 1.73\" (1.568 m)   Wt 68.6 kg (151 lb 3.2 oz)   LMP 10/16/2021 Comment: denies preg  SpO2 98%   BMI 27.90 kg/m²     Physical Exam  Vitals and nursing note reviewed.   Constitutional:       Appearance: She is well-developed.   HENT:      Head: Normocephalic and atraumatic.      Right Ear: External ear normal.      Left Ear: External ear normal.      Nose: Nose normal.   Eyes:      Extraocular Movements: Extraocular movements intact.      Conjunctiva/sclera: Conjunctivae normal.      Pupils: Pupils are equal, round, and reactive to light.   Cardiovascular:      Rate and Rhythm: Normal rate and regular rhythm.      Heart sounds: Normal heart sounds.   Pulmonary:      Effort: Pulmonary effort is normal.      Breath sounds: Normal breath sounds.   Abdominal:      General: Bowel sounds are normal.      Palpations: Abdomen is soft.   Musculoskeletal:         General: Normal range of motion.      Cervical back: Normal range of motion and neck supple.   Skin:     General: Skin is warm and dry.      Capillary Refill: Capillary refill takes less than 2 seconds.   Neurological:      General: No focal deficit present.      Mental Status: She is alert and oriented to person, place, and time.   Psychiatric:         Mood and Affect: Mood normal.         Behavior: Behavior normal.         Thought Content: Thought content normal.         Judgment: Judgment normal.          Judith Nation DO  [facility], [place], operated by [facility]    "

## 2024-03-14 ENCOUNTER — TELEPHONE (OUTPATIENT)
Dept: NEUROLOGY | Facility: CLINIC | Age: 54
End: 2024-03-14

## 2024-03-26 ENCOUNTER — TELEPHONE (OUTPATIENT)
Dept: NEUROLOGY | Facility: CLINIC | Age: 54
End: 2024-03-26

## 2024-03-26 NOTE — TELEPHONE ENCOUNTER
[facility] offering patient earlier appointment today at 3:30 pm or this Thursday the 28 th at 2:30 pm with Dr. Jordan in [place]

## 2024-03-28 ENCOUNTER — HOSPITAL ENCOUNTER (OUTPATIENT)
Dept: MAMMOGRAPHY | Facility: CLINIC | Age: 54
Discharge: HOME | End: 2024-03-28
Payer: COMMERCIAL

## 2024-03-28 VITALS — WEIGHT: 151 LBS | HEIGHT: 61 IN | BODY MASS INDEX: 28.51 KG/M2

## 2024-03-28 DIAGNOSIS — Z12.31 ENCOUNTER FOR SCREENING MAMMOGRAM FOR BREAST CANCER: ICD-10-CM

## 2024-03-28 PROCEDURE — G0279 TOMOSYNTHESIS, MAMMO: HCPCS

## 2024-03-28 PROCEDURE — 77066 DX MAMMO INCL CAD BI: CPT

## 2024-04-05 ENCOUNTER — CONSULT (OUTPATIENT)
Dept: NEUROLOGY | Facility: CLINIC | Age: 54
End: 2024-04-05
Payer: COMMERCIAL

## 2024-04-05 VITALS
TEMPERATURE: 94.7 F | OXYGEN SATURATION: 97 % | SYSTOLIC BLOOD PRESSURE: 110 MMHG | HEART RATE: 85 BPM | WEIGHT: 150.2 LBS | BODY MASS INDEX: 28.38 KG/M2 | DIASTOLIC BLOOD PRESSURE: 72 MMHG

## 2024-04-05 DIAGNOSIS — G44.219 EPISODIC TENSION-TYPE HEADACHE, NOT INTRACTABLE: ICD-10-CM

## 2024-04-05 DIAGNOSIS — G44.82 PRIMARY HEADACHE ASSOCIATED WITH SEXUAL ACTIVITY: Primary | ICD-10-CM

## 2024-04-05 PROCEDURE — 99204 OFFICE O/P NEW MOD 45 MIN: CPT

## 2024-04-05 NOTE — PROGRESS NOTES
[facility] Consult  PATIENT:  Shilpa Saleh  MRN:  [id number]  :  1970  DATE OF SERVICE:  2024  REFERRED BY: Judith Nation DO  PMD: Judith Nation DO    Assessment/Plan:     Shilpa Saleh is a very pleasant 53 y.o. female with a past medical history that includes patellofemoral dysfunction, chronic left shoulder pain, postmenopausal syndrome, hyperlipidemia, vitamin D deficiency, insomnia referred here for evaluation of headache.    Initial evaluation 2024    Primary headache with sexual activity/episodic tension type headache:    I had the pleasure of evaluating Shilpa today in the office at [facility] in [place].  The patient is presenting as an initial new patient consultation in regard to headaches.  The patient is presenting with 2 types of different headaches at this point in time.  The patient notes that there are some headaches that she will get usually a few times out of the month.  She notes that there is no particular time of the day or with these headaches occur, and they are usually presenting at the bifrontal area of the head.  She notes that she could usually take Excedrin for the headaches and they will seem to decrease after some period of time.  No migrainous symptoms associated with these headaches, likely believe that these are related to an episodic tension type headache.  Advised the patient that for management she should continue to with taking Excedrin as needed.    The patient was also experiencing another type of headache that was occurring when she was sexually active during intercourse, and usually the headache would occur most significantly at climax/orgasm.  The patient notes that almost every time she has intercourse this is occurring.  She can usually feel the headache building up until she gets closer and closer to having an orgasm/climax.  The patient notes that when she will finally have the orgasm, she  will notice a significant headache that she describes as an explosion of pain.  She notes that this will only occur for about 10-20 seconds at a time before the headache seems to slowly taper off.  She notes that it is not more than a few minutes until the headache goes away.  Does not have to take any abortive medications after the headache occurs she states.  Usually not very concerning to her, outside the fact she is not quite sure what is going on.  She states it does make this hard for her to want to have intercourse at this time as well.    What the patient describes appears to be a primary headache with sexual activity.  These headaches can certainly occur preorgasm, during orgasm, or post orgasm as well.  The patient's symptoms seem to correlate with this type of headache dysfunction.  At this point in time, should rule out for any significant secondary headache causes or intracranial abnormalities.  Recommend that the patient have an MRI brain with and without contrast and also an MRA head with and without contrast to rule out any significant vascular abnormalities or irregularities and also to rule out for any significant intracranial masses/lesions which could also be contributing to the headaches as well.  Stated to the patient that we were going to do the imaging first before proceeding with any preventative medication in case there was anything underlying occurring.  Certainly, at the next visit we could consider something like indomethacin or something like propranolol as they have been indicated to help for prevention of these types of headaches during sexual activity.  Advised the patient to follow-up in 3 months after her imaging has been completed.    Patient Instructions:    Additional Testing:   Neurodiagnostic workup: MRI brain with and without contrast and MRA head with and without contrast.  Order BUN and creatinine at this time, should acquire about 2 to 3 days prior to her  imaging.    Headache Calendar  Please maintain a headache calendar  Consider using phone applications such as Migraine Geraldo or Migraine Diary    Headache/migraine treatment:     Rescue medications (for immediate treatment of a headache):   It is ok to take ibuprofen, acetaminophen or naproxen (Advil, Tylenol,  Aleve, Excedrin) if they help your headaches you should limit these to No more than 3 times a week to avoid medication overuse/rebound headaches.       Over the counter preventive supplements for headaches/migraines (if you try, try for 3 months straight)  (to take every day to help prevent headaches - not to take at the time of headache):  There are combo pills online of these - none of which regulated by FDA and double check dosing - take appropriate dose only once a day- prevent a migraine, migravent, mind ease, migrelief   [] Magnesium 400mg daily (If any diarrhea or upset stomach, decrease dose  as tolerated)  [] Riboflavin (Vitamin B2) 400mg daily (may make your urine bright/neon yellow)     - All supplements can be purchased online       Lifestyle Recommendations:  [x] SLEEP - Maintain a regular sleep schedule: Adults need at least 7-8 hours of uninterrupted a night. Maintain good sleep hygiene:  Going to bed and waking up at consistent times, avoiding excessive daytime naps, avoiding caffeinated beverages in the evening, avoid excessive stimulation in the evening and generally using bed primarily for sleeping.  One hour before bedtime would recommend turning lights down lower, decreasing your activity (may read quietly, listen to music at a low volume). When you get into bed, should eliminate all technology (no texting, emailing, playing with your phone, iPad or tablet in bed).  [x] HYDRATION - Maintain good hydration.  Drink  2L of fluid a day (4 typical small water bottles)  [x] DIET - Maintain good nutrition. In particular don't skip meals and try and eat healthy balanced meals regularly.  [x]  "TRIGGERS - Look for other triggers and avoid them: Limit caffeine to 1-2 cups a day or less. Avoid dietary triggers that you have noticed bring on your headaches (this could include aged cheese, peanuts, MSG, aspartame and nitrates).  [x] EXERCISE - physical exercise as we all know is good for you in many ways, and not only is good for your heart, but also is beneficial for your mental health, cognitive health and  chronic pain/headaches. I would encourage at the least 5 days of physical exercise weekly for at least 30 minutes.     Education and Follow-up  [x] Please call with any questions or concerns. Of course if any new concerning symptoms go to the emergency department.  [x] Follow up in 3 months with Russell RENNER:   We had the pleasure of evaluating Shilpa Saleh in neurological consultation today. Shilpa Saleh is a  53 y.o. female who presents today for evaluation of headaches.     History obtained from patient as well as available medical record review.  History of Present Illness:   Current medical illnesses  or past medical history include patellofemoral dysfunction, chronic left shoulder pain, postmenopausal syndrome, hyperlipidemia, vitamin D deficiency, insomnia      Interval History:    Does have a significant headache when she is reaching an orgasm she states. Almost every single time the patient is about to climax or during climax she will have this headache. November of 2021 when she first ended up noticing this, did not always happen originally. Had COVID in September of 2021. The beginning of 2023 she had first brought this up to her primary care provider. No headache leading up to orgasm or climax she states, however, she does feel as though it is slowly building up over time. Right side of the head pain where the headache occurs. Does have a dull pain over the region of the right side of the head. More frontal or top of the head. Does feel the headache building up and then has an \"explosion\" " "of pain afterwards. 10-20 seconds after occurring, the pain will seem to slowly subside. Not having any significant remnant of a headache afterward, usually gone within a few minutes..  Use to have migraines in [id number] she states. She did use to take Maxalt before at the onset of her migraine headaches. Had not had significant migraine headaches in many years, still having headaches now just less severe. Different type of headache, not classified as a migraine.     Headaches started? In 2021  How often do the headaches occur?   - as of 4/5/2024: Other headache: few times a month   What time of the day do the headaches start?  No particular time of day   How long do the headaches last? Other headaches: trying to catch the headaches with the Excedrin. Does seem to work at aborting them   Are you ever headache free? Yes     Where is your headache located and pain quality?   [place]/climax headaches: right sided, more frontal and top of the head. \"Explosion\" type of pain   Other headaches: Bilateral frontal area of the head, pounding   What is the intensity of pain?   [place]imax headache: Worst 8/10  Other headaches: Worst/Average: 8 or 9/10  Associated symptoms:     Not having any migrainous symptoms with either types of headaches at this time.     Things that make the headache worse? No specific movements     Any positional change headaches? No positional change headaches    Headache triggers:  no specific triggers, hormonal changes     Have you seen someone else for headaches or pain? No  Have you had trigger point injection performed and how often? No  Have you had Botox injection performed and how often? No   Have you had epidural injections or transforaminal injections performed? No  Are you current pregnant or planning on getting pregnant? Post-Menopausal   Have you ever had any Brain imaging? no    Last eye exam: January of 2024, no other issues with her vision she states    What medications do you " take or have you taken for your headaches?   ABORTIVE:    OTC medications: Excedrin migraine   Prescription: None    Past/ failed/contraindicated:  OTC medications: Motrin, Tylenol   Prescription: Maxalt (for migraines)    PREVENTIVE:   None    Past/ failed/contraindicated:  None      LIFESTYLE  Sleep   - averages: 6-7 hours of sleep at night  Problems falling asleep?:   No  Problems staying asleep?:  No    -  has told her she snores but no issues with breathing.     Physical activity: Not as much as she would like, tries to get to the gym a few times a week    Water: 40-50 ounces of water per day  Caffeine: 1 cup of coffee a few times a week    Mood: Denies history of anxiety or depression or other diagnosed mood disorder    The following portions of the patient's history were reviewed and updated as appropriate: allergies, current medications, past family history, past medical history, past social history, past surgical history and problem list.    Pertinent family history:  Family history of headaches:  migraine headaches in mother and son  Any family history of aneurysms - No    Pertinent social history:  Work: retired as of December  Education: high school, some community college  Lives with , son, and nephew    Illicit Drugs: denies  Alcohol/tobacco: Denies tobacco use, alcohol intake: social drinker    Past Medical History:     Past Medical History:   Diagnosis Date    Abnormal blood chemistry     Acute bilateral low back pain without sciatica 2020    Allergic     Seasonal    Chest pain     HX OF CHEST PAIN STRESS     GERD (gastroesophageal reflux disease)     Occasionally    H/O  section     hx of  delivery    Headache(784.0)     Hyperlipidemia     Migraine     Miscarriage     Rh incompatibility     Varicella 1979       Patient Active Problem List   Diagnosis    Headache, migraine    Insomnia    Mild hyperlipidemia    Patellofemoral dysfunction    Vitamin D  deficiency    Elevated liver enzymes    Chronic left shoulder pain    Adhesive capsulitis of left shoulder    Annual physical exam    Post menopausal syndrome       Medications:      Current Outpatient Medications   Medication Sig Dispense Refill    cholecalciferol (VITAMIN D3) 1,000 units tablet Take 1 tablet by mouth daily      estradiol (ESTRACE VAGINAL) 0.1 mg/g vaginal cream Insert 1 g into the vagina 2 (two) times a week 42.5 g PRN    estradiol (Estrace) 1 mg tablet Take 1 tablet (1 mg total) by mouth daily 30 tablet 11    Omega-3 Fatty Acids (FISH OIL PO) Take by mouth Taking 1250 mg      Progesterone 100 MG CAPS Take 100 mg by mouth at bedtime 30 capsule 11    rosuvastatin (CRESTOR) 10 MG tablet Take 1 tablet (10 mg total) by mouth daily 90 tablet 1     No current facility-administered medications for this visit.        Allergies:    No Known Allergies    Family History:     Family History   Problem Relation Age of Onset    Coronary artery disease Mother     Hypothyroidism Mother     Hyperlipidemia Mother         Pure    Migraines Mother     Osteoporosis Mother     Thyroid disease Mother     Arthritis Mother     Cancer Father     Diabetes Father     Colon cancer Father 70    Dementia Father     Diabetes Maternal Grandmother     Diabetes Paternal Grandmother     Stroke Paternal Grandmother     No Known Problems Maternal Aunt     Heart attack Maternal Grandfather     No Known Problems Paternal Grandfather     No Known Problems Son     No Known Problems Son        Social History:       Social History     Socioeconomic History    Marital status: /Civil Union     Spouse name: Not on file    Number of children: Not on file    Years of education: Not on file    Highest education level: Not on file   Occupational History    Not on file   Tobacco Use    Smoking status: Never     Passive exposure: Never    Smokeless tobacco: Never   Vaping Use    Vaping status: Never Used   Substance and Sexual Activity     Alcohol use: Not Currently    Drug use: Never    Sexual activity: Yes     Partners: Male   Other Topics Concern    Not on file   Social History Narrative    Not on file     Social Determinants of Health     Financial Resource Strain: Not on file   Food Insecurity: Not on file   Transportation Needs: Not on file   Physical Activity: Not on file   Stress: Not on file   Social Connections: Not on file   Intimate Partner Violence: Not on file   Housing Stability: Not on file         Objective:     Physical Exam:                                                                 Vitals:            Constitutional:    /72 (BP Location: Right arm, Patient Position: Sitting, Cuff Size: Adult)   Pulse 85   Temp (!) 94.7 °F (34.8 °C) (Temporal)   Wt 68.1 kg (150 lb 3.2 oz)   LMP 10/16/2021 Comment: denies preg  SpO2 97%   BMI 28.38 kg/m²   BP Readings from Last 3 Encounters:   04/05/24 110/72   01/17/24 112/80   11/30/23 108/74     Pulse Readings from Last 3 Encounters:   04/05/24 85   01/17/24 73   11/30/23 93         Well developed, well nourished, well groomed. No dysmorphic features.       Psychiatric:  Normal behavior and appropriate affect        Neurological Examination:     Mental status/cognitive function:   Orientated to time, place and person. Recent and remote memory intact. Attention span and concentration as well as fund of knowledge are appropriate for age. Normal language and spontaneous speech.    Cranial Nerves:  II-visual fields full.   III, IV, VI-Pupils were equal, round, and reactive to light and accomodation. Extraocular movements were full and conjugate without nystagmus. Conjugate gaze, normal smooth pursuits, normal saccades   V-facial sensation symmetric.    VII-facial expression symmetric, intact forehead wrinkle, strong eye closure, symmetric smile    VIII-hearing grossly intact bilaterally   IX, X-palate elevation symmetric, no dysarthria.   XI-shoulder shrug strength intact    XII-tongue  protrusion midline.    Motor Exam: symmetric bulk and tone throughout, no pronator drift. Power/strength 5/5 bilateral upper and lower extremities, no atrophy, fasciculations or abnormal movements noted.   Sensory: grossly intact light touch in all extremities.   Reflexes: brachioradialis 2+, biceps 2+, knee 2+ bilaterally  Coordination: Finger nose finger intact bilaterally, no apparent dysmetria, ataxia or tremor noted  Gait: steady casual and tandem gait.         Pertinent Imaging:     No pertinent imagining available at this time.       Review of Systems:     Review of Systems   Constitutional:  Negative for appetite change, fatigue and fever.   HENT: Negative.  Negative for hearing loss, tinnitus, trouble swallowing and voice change.    Eyes: Negative.  Negative for photophobia, pain and visual disturbance.   Respiratory: Negative.  Negative for shortness of breath.    Cardiovascular: Negative.  Negative for palpitations.   Gastrointestinal: Negative.  Negative for nausea and vomiting.   Endocrine: Negative.  Negative for cold intolerance.   Genitourinary: Negative.  Negative for dysuria, frequency and urgency.   Musculoskeletal:  Negative for back pain, gait problem, myalgias, neck pain and neck stiffness.   Skin: Negative.  Negative for rash.   Allergic/Immunologic: Negative.    Neurological: Negative.  Negative for dizziness, tremors, seizures, syncope, facial asymmetry, speech difficulty, weakness, light-headedness, numbness and headaches.   Hematological: Negative.  Does not bruise/bleed easily.   Psychiatric/Behavioral: Negative.  Negative for confusion, hallucinations and sleep disturbance.    All other systems reviewed and are negative.       I have spent 40 minutes with the patient today in which greater than 50% of this time was spent in counseling/coordination of care regarding Instructions for management, Patient and family education, Risk factor reductions, Impressions, Counseling / Coordination of  care, Documenting in the medical record, Reviewing / ordering tests, medicine, procedures  , and Obtaining or reviewing history  . I also spent 15 minutes non face to face for this patient the same day.     Activity Minutes   Precharting/reviewing 5   Patient care/counseling 40   Postcharting/care coordination 10       Author:  Salomón Cho PA-C 4/5/2024 8:31 AM

## 2024-04-05 NOTE — PATIENT INSTRUCTIONS
Patient Instructions:    Additional Testing:   Neurodiagnostic workup: MRI brain with and without contrast and MRA head with and without contrast.  Order BUN and creatinine at this time, should acquire about 2 to 3 days prior to her imaging.    Headache Calendar  Please maintain a headache calendar  Consider using phone applications such as Migraine Geraldo or Migraine Diary    Headache/migraine treatment:     Rescue medications (for immediate treatment of a headache):   It is ok to take ibuprofen, acetaminophen or naproxen (Advil, Tylenol,  Aleve, Excedrin) if they help your headaches you should limit these to No more than 3 times a week to avoid medication overuse/rebound headaches.       Over the counter preventive supplements for headaches/migraines (if you try, try for 3 months straight)  (to take every day to help prevent headaches - not to take at the time of headache):  There are combo pills online of these - none of which regulated by FDA and double check dosing - take appropriate dose only once a day- prevent a migraine, migravent, mind ease, migrelief   [] Magnesium 400mg daily (If any diarrhea or upset stomach, decrease dose  as tolerated)  [] Riboflavin (Vitamin B2) 400mg daily (may make your urine bright/neon yellow)     - All supplements can be purchased online       Lifestyle Recommendations:  [x] SLEEP - Maintain a regular sleep schedule: Adults need at least 7-8 hours of uninterrupted a night. Maintain good sleep hygiene:  Going to bed and waking up at consistent times, avoiding excessive daytime naps, avoiding caffeinated beverages in the evening, avoid excessive stimulation in the evening and generally using bed primarily for sleeping.  One hour before bedtime would recommend turning lights down lower, decreasing your activity (may read quietly, listen to music at a low volume). When you get into bed, should eliminate all technology (no texting, emailing, playing with your phone, iPad or tablet in  bed).  [x] HYDRATION - Maintain good hydration.  Drink  2L of fluid a day (4 typical small water bottles)  [x] DIET - Maintain good nutrition. In particular don't skip meals and try and eat healthy balanced meals regularly.  [x] TRIGGERS - Look for other triggers and avoid them: Limit caffeine to 1-2 cups a day or less. Avoid dietary triggers that you have noticed bring on your headaches (this could include aged cheese, peanuts, MSG, aspartame and nitrates).  [x] EXERCISE - physical exercise as we all know is good for you in many ways, and not only is good for your heart, but also is beneficial for your mental health, cognitive health and  chronic pain/headaches. I would encourage at the least 5 days of physical exercise weekly for at least 30 minutes.     Education and Follow-up  [x] Please call with any questions or concerns. Of course if any new concerning symptoms go to the emergency department.  [x] Follow up in 3 months with Russell L[facility]ON

## 2024-04-29 ENCOUNTER — APPOINTMENT (OUTPATIENT)
Dept: LAB | Facility: CLINIC | Age: 54
End: 2024-04-29
Payer: COMMERCIAL

## 2024-04-29 DIAGNOSIS — G44.82 PRIMARY HEADACHE ASSOCIATED WITH SEXUAL ACTIVITY: ICD-10-CM

## 2024-04-29 LAB
BUN SERPL-MCNC: 16 MG/DL (ref 5–25)
CREAT SERPL-MCNC: 0.79 MG/DL (ref 0.6–1.3)
GFR SERPL CREATININE-BSD FRML MDRD: 85 ML/MIN/1.73SQ M

## 2024-04-29 PROCEDURE — 36415 COLL VENOUS BLD VENIPUNCTURE: CPT

## 2024-04-29 PROCEDURE — 84520 ASSAY OF UREA NITROGEN: CPT

## 2024-04-29 PROCEDURE — 82565 ASSAY OF CREATININE: CPT

## 2024-05-01 ENCOUNTER — HOSPITAL ENCOUNTER (OUTPATIENT)
Dept: RADIOLOGY | Age: 54
Discharge: HOME | End: 2024-05-01
Payer: COMMERCIAL

## 2024-05-01 DIAGNOSIS — G44.82 PRIMARY HEADACHE ASSOCIATED WITH SEXUAL ACTIVITY: ICD-10-CM

## 2024-05-01 PROCEDURE — A9585 GADOBUTROL INJECTION: HCPCS

## 2024-05-01 PROCEDURE — 70546 MR ANGIOGRAPH HEAD W/O&W/DYE: CPT

## 2024-05-01 PROCEDURE — 70553 MRI BRAIN STEM W/O & W/DYE: CPT

## 2024-05-01 RX ORDER — GADOBUTROL 604.72 MG/ML
7 INJECTION INTRAVENOUS
Status: COMPLETED | OUTPATIENT
Start: 2024-05-01 | End: 2024-05-01

## 2024-05-01 RX ADMIN — GADOBUTROL 7 ML: 604.72 INJECTION INTRAVENOUS at 11:45

## 2024-06-03 DIAGNOSIS — N95.1 MENOPAUSAL SYMPTOMS: ICD-10-CM

## 2024-06-03 RX ORDER — PROGESTERONE 100 MG/1
100 CAPSULE ORAL
Qty: 30 CAPSULE | Refills: 11 | OUTPATIENT
Start: 2024-06-03

## 2024-06-03 RX ORDER — ESTRADIOL 1 MG/1
1 TABLET ORAL DAILY
Qty: 30 TABLET | Refills: 11 | OUTPATIENT
Start: 2024-06-03

## 2024-07-01 ENCOUNTER — TELEPHONE (OUTPATIENT)
Dept: NEUROLOGY | Facility: CLINIC | Age: 54
End: 2024-07-01

## 2024-07-06 LAB
25(OH)D3 SERPL-MCNC: 30 NG/ML (ref 30–100)
ALBUMIN SERPL-MCNC: 4.3 G/DL (ref 3.6–5.1)
ALBUMIN/GLOB SERPL: 1.5 (CALC) (ref 1–2.5)
ALP SERPL-CCNC: 79 U/L (ref 37–153)
ALT SERPL-CCNC: 28 U/L (ref 6–29)
AST SERPL-CCNC: 25 U/L (ref 10–35)
BILIRUB SERPL-MCNC: 0.5 MG/DL (ref 0.2–1.2)
BUN SERPL-MCNC: 15 MG/DL (ref 7–25)
BUN/CREAT SERPL: NORMAL (CALC) (ref 6–22)
CALCIUM SERPL-MCNC: 9 MG/DL (ref 8.6–10.4)
CHLORIDE SERPL-SCNC: 104 MMOL/L (ref 98–110)
CHOLEST SERPL-MCNC: 237 MG/DL
CHOLEST/HDLC SERPL: 2.6 (CALC)
CO2 SERPL-SCNC: 28 MMOL/L (ref 20–32)
CREAT SERPL-MCNC: 0.84 MG/DL (ref 0.5–1.03)
ERYTHROCYTE [DISTWIDTH] IN BLOOD BY AUTOMATED COUNT: 12.2 % (ref 11–15)
GFR/BSA.PRED SERPLBLD CYS-BASED-ARV: 82 ML/MIN/1.73M2
GLOBULIN SER CALC-MCNC: 2.8 G/DL (CALC) (ref 1.9–3.7)
GLUCOSE SERPL-MCNC: 89 MG/DL (ref 65–99)
HCT VFR BLD AUTO: 40.4 % (ref 35–45)
HDLC SERPL-MCNC: 90 MG/DL
HGB BLD-MCNC: 13.8 G/DL (ref 11.7–15.5)
LDLC SERPL CALC-MCNC: 122 MG/DL (CALC)
MCH RBC QN AUTO: 29.4 PG (ref 27–33)
MCHC RBC AUTO-ENTMCNC: 34.2 G/DL (ref 32–36)
MCV RBC AUTO: 86.1 FL (ref 80–100)
NONHDLC SERPL-MCNC: 147 MG/DL (CALC)
PLATELET # BLD AUTO: 276 THOUSAND/UL (ref 140–400)
PMV BLD REES-ECKER: 10.4 FL (ref 7.5–12.5)
POTASSIUM SERPL-SCNC: 4.3 MMOL/L (ref 3.5–5.3)
PROT SERPL-MCNC: 7.1 G/DL (ref 6.1–8.1)
RBC # BLD AUTO: 4.69 MILLION/UL (ref 3.8–5.1)
SODIUM SERPL-SCNC: 139 MMOL/L (ref 135–146)
TRIGL SERPL-MCNC: 140 MG/DL
TSH SERPL-ACNC: 1.6 MIU/L
WBC # BLD AUTO: 6.7 THOUSAND/UL (ref 3.8–10.8)

## 2024-07-09 ENCOUNTER — OFFICE VISIT (OUTPATIENT)
Dept: NEUROLOGY | Facility: CLINIC | Age: 54
End: 2024-07-09
Payer: COMMERCIAL

## 2024-07-09 VITALS
OXYGEN SATURATION: 96 % | BODY MASS INDEX: 29.08 KG/M2 | HEART RATE: 77 BPM | SYSTOLIC BLOOD PRESSURE: 118 MMHG | TEMPERATURE: 98.1 F | DIASTOLIC BLOOD PRESSURE: 78 MMHG | WEIGHT: 153.9 LBS

## 2024-07-09 DIAGNOSIS — G44.82 PRIMARY HEADACHE ASSOCIATED WITH SEXUAL ACTIVITY: Primary | ICD-10-CM

## 2024-07-09 DIAGNOSIS — G44.219 EPISODIC TENSION-TYPE HEADACHE, NOT INTRACTABLE: ICD-10-CM

## 2024-07-09 PROCEDURE — 99214 OFFICE O/P EST MOD 30 MIN: CPT

## 2024-07-09 RX ORDER — PROPRANOLOL HYDROCHLORIDE 20 MG/1
TABLET ORAL
Qty: 30 TABLET | Refills: 1 | Status: SHIPPED | OUTPATIENT
Start: 2024-07-09

## 2024-07-09 NOTE — PROGRESS NOTES
[facility]  PATIENT:  Shilpa Saleh  MRN:  [id number]  :  1970  DATE OF SERVICE:  2024      Assessment/Plan:     Primary headache associated with sexual activity:    I had the pleasure of seeing Shilpa today in the office at [facility] in [place].  She is presenting today for an office visit follow-up in regard to her headaches.  At the last visit, patient was diagnosed with primary headache associated with sexual activity.  Since that appointment, it was noted that the patient had an MRI brain with and without contrast and an MRA head with and without contrast which was ultimately unrevealing for any significant intracranial abnormality or significant vascular abnormality that would be concerning for a secondary headache disorder.  From my standpoint, still appears that the patient has primary headache associated with sexual activity.  The patient stated that since her last appointment she was doing relatively well with the headaches although they were still occurring.  In regard to her tension type headache she stated that these were relatively fine and she was only having them a few days out of the month.  For the most part, Excedrin Migraine seem to be handling them relatively well.  Patient is doing relatively well job controlling her lifestyle risk factors that would potentially contribute to tension type headaches.  In regard to the patient's headaches associated with sexual activity, had recommended potential prophylactic treatment prior to sexual intercourse with either propranolol or indomethacin.  We decided to try propranolol 20 mg, patient was to take 2 tablets by mouth once 30 to 60 minutes prior to sexual intercourse.  Although, did advise the patient to start the first few times with just 1 tablet of the 20 mg to see how she does.  Afterwards the patient can try to increase to taking 2 tablets at 1 time.  Advised the patient to reach out to me  in the next few weeks and let me know how the medication is working for her.  If she has any side effects of the medication she can let me know as well, also advised her to keep a close eye on her heart rate by using her Fitbit.  Advised the patient that she should follow-up in about 6 months time for further evaluation.    Patient Instructions:    Headache Calendar  Please maintain a headache calendar  Consider using phone applications such as Migraine Geraldo or Migraine Diary    Headache/migraine treatment:     Rescue medications (for immediate treatment of a headache):   It is ok to take ibuprofen, acetaminophen or naproxen (Advil, Tylenol,  Aleve, Excedrin) if they help your headaches you should limit these to No more than 3 times a week to avoid medication overuse/rebound headaches.     Prescription preventive medications for headaches/migraines   (to take every day to help prevent headaches - not to take at the time of headache):  - Start propranolol 20 mg, take 2 tablets by mouth once 30 to 60 minutes prior to sexual intercourse to treat primary headache associated with sexual activity.  Advised the patient to start she can try just taking 1 tablet by mouth 30 to 60 minutes before sexual intercourse.  She can try this for the first few times and see if there is any benefit or see if she would be comfortable with increasing the dosage afterwards.  Would advise the patient to keep a close eye on her heart rate and blood pressure while she is taking the propranolol and if she has any issues with the medication she can certainly let me know.    *Typically these types of medications take time until you see the benefit, although some may see improvement in days, often it may take weeks, especially if the medication is being titrated up to a beneficial level. Please contact us if there are any concerns or questions regarding the medication.     Over the counter preventive supplements for headaches/migraines (if you  try, try for 3 months straight)  (to take every day to help prevent headaches - not to take at the time of headache):  There are combo pills online of these - none of which regulated by FDA and double check dosing - take appropriate dose only once a day- prevent a migraine, migravent, mind ease, migrelief   [] Magnesium 400mg daily (If any diarrhea or upset stomach, decrease dose  as tolerated)  [] Riboflavin (Vitamin B2) 400mg daily (may make your urine bright/neon yellow)    - All supplements can be purchased online    Lifestyle Recommendations:  [x] SLEEP - Maintain a regular sleep schedule: Adults need at least 7-8 hours of uninterrupted a night. Maintain good sleep hygiene:  Going to bed and waking up at consistent times, avoiding excessive daytime naps, avoiding caffeinated beverages in the evening, avoid excessive stimulation in the evening and generally using bed primarily for sleeping.  One hour before bedtime would recommend turning lights down lower, decreasing your activity (may read quietly, listen to music at a low volume). When you get into bed, should eliminate all technology (no texting, emailing, playing with your phone, iPad or tablet in bed).  [x] HYDRATION - Maintain good hydration.  Drink  2L of fluid a day (4 typical small water bottles)  [x] DIET - Maintain good nutrition. In particular don't skip meals and try and eat healthy balanced meals regularly.  [x] TRIGGERS - Look for other triggers and avoid them: Limit caffeine to 1-2 cups a day or less. Avoid dietary triggers that you have noticed bring on your headaches (this could include aged cheese, peanuts, MSG, aspartame and nitrates).  [x] EXERCISE - physical exercise as we all know is good for you in many ways, and not only is good for your heart, but also is beneficial for your mental health, cognitive health and  chronic pain/headaches. I would encourage at the least 5 days of physical exercise weekly for at least 30 minutes.  "    Education and Follow-up  [x] Please call with any questions or concerns. Of course if any new concerning symptoms go to the emergency department.  [x] Follow up in 6 months with Russell LONDON      History of Present Illness:     For Review:    We had the pleasure of evaluating hSilpa Saleh in neurological follow up  today for headaches.  As you know,  she is a 54 y.o.   female with a past history of migraine headaches. Patient was last seen in the office at [facility] on 04/05/2024 by myself.  the patient was last seen for an initial new patient consultation in regard to her headaches.  It was noted at the last visit the patient had 2 different types of headaches, she noted that she was having some tension headaches that occurred a few days out of the month.  Patient noted that the headaches were usually bifrontal in location.  For the most part, she was able to take  and Excedrin Migraine which seem to overall decrease the amount of headaches that she was having.  Patient reported no migrainous symptoms with the headaches and it seemed as though they were just episodic tension headaches.  Patient was able to continue with Excedrin migraine as needed.    In regards to the patient's other headaches, it was noted that these headaches were occurring during sexual activity during intercourse.  Patient noted that usually the headache would occur most significantly at climax/orgasm.  Patient noted that almost every time she had intercourse this was occurring.  She can usually feel the headache building up until she gets closer to orgasm/climax.  Patient stated that when she does have an orgasm, she will notice a significant headache that she describes as a \"explosion\" of pain.  She notes that this occurs for about 10 to 20 seconds and then slowly the headache seems to taper off.  She does not take any abortive medications for the headaches.  Usually this is not quite concerning to her, although she is " "a little bit concerned in regards to what may be underlying that is causing this.  Advised the patient to receive an MRI brain with and without contrast and MRA head with and without contrast to rule out any significant vascular abnormalities or irregularities that may be contributing to the headaches themselves.  It did seem as though the patient's headaches were aligned most closely with a primary headache with sexual activity.  Advised the patient that if we acquire imaging and if everything had looked clear with no significant abnormalities causing a secondary headache, we could certainly look into secondary methods of treatment in the future with using something like indomethacin or propranolol.      Current medical illnesses:patellofemoral dysfunction, chronic left shoulder pain, postmenopausal syndrome, hyperlipidemia, vitamin D deficiency, insomnia       What medications do you take or have you taken for your headaches?   Current Preventive:   None    Current Abortive:   Excedrin migraine     Prior Preventive:   None    Prior Abortive:   Motrin, Tylenol, Maxalt (for migraines)     Interval updates as of 7/9/2024:    Headaches with sexual activity not happening every time with sexual intercourse. Patient states that if she can hold off on having an orgasm and then go back to sexual intercourse later and the headaches are much more mild. Usually still brief 10-20 second period of pain and then the headache goes away.  The patient still describes the pain as an \"explosion\" of pain when it occurs. No change in characteristics to the headaches at this time.  Certainly interested in using preventive medications to treat the headaches before sexual intercourse at this appointment today.    Not having tension headaches as frequently as before as well. Few days out of the month still with a tension headache, has not had any headaches this month. Excedrin migraine seems to be handling these tension headaches that she " is having currently. Still does feel she is sleeping well at night at this time. Has been exercising the same amount she had been in the past. 64-72 ounces of water in per day, and coffee a few times per week. No anxiety or depression or other mood related disorders reported.      Reviewed old notes from physician seen in the past- see above HPI for summary of previous encounters.       Past Medical History:   Diagnosis Date    Abnormal blood chemistry     Acute bilateral low back pain without sciatica 2020    Allergic     Seasonal    Chest pain     HX OF CHEST PAIN STRESS     GERD (gastroesophageal reflux disease)     Occasionally    H/O  section     hx of  delivery    Headache(784.0)     Hyperlipidemia     Migraine     Miscarriage     Rh incompatibility     Varicella        Patient Active Problem List   Diagnosis    Headache, migraine    Insomnia    Mild hyperlipidemia    Patellofemoral dysfunction    Vitamin D deficiency    Elevated liver enzymes    Chronic left shoulder pain    Adhesive capsulitis of left shoulder    Annual physical exam    Post menopausal syndrome    Episodic tension-type headache, not intractable       Medications:      Current Outpatient Medications   Medication Sig Dispense Refill    cholecalciferol (VITAMIN D3) 1,000 units tablet Take 1 tablet by mouth daily      estradiol (ESTRACE VAGINAL) 0.1 mg/g vaginal cream Insert 1 g into the vagina 2 (two) times a week 42.5 g PRN    estradiol (Estrace) 1 mg tablet Take 1 tablet (1 mg total) by mouth daily 30 tablet 11    Omega-3 Fatty Acids (FISH OIL PO) Take by mouth Taking 1250 mg      Progesterone 100 MG CAPS Take 100 mg by mouth at bedtime 30 capsule 11    rosuvastatin (CRESTOR) 10 MG tablet Take 1 tablet (10 mg total) by mouth daily 90 tablet 1     No current facility-administered medications for this visit.        Allergies:    No Known Allergies    Family History:     Family History   Problem Relation Age  of Onset    Coronary artery disease Mother     Hypothyroidism Mother     Hyperlipidemia Mother         Pure    Migraines Mother     Osteoporosis Mother     Thyroid disease Mother     Arthritis Mother     Cancer Father     Diabetes Father     Colon cancer Father 70    Dementia Father     Diabetes Maternal Grandmother     Diabetes Paternal Grandmother     Stroke Paternal Grandmother     No Known Problems Maternal Aunt     Heart attack Maternal Grandfather     No Known Problems Paternal Grandfather     No Known Problems Son     No Known Problems Son        Social History:     Social History     Socioeconomic History    Marital status: /Civil Union     Spouse name: Not on file    Number of children: Not on file    Years of education: Not on file    Highest education level: Not on file   Occupational History    Not on file   Tobacco Use    Smoking status: Never     Passive exposure: Never    Smokeless tobacco: Never   Vaping Use    Vaping status: Never Used   Substance and Sexual Activity    Alcohol use: Not Currently    Drug use: Never    Sexual activity: Yes     Partners: Male   Other Topics Concern    Not on file   Social History Narrative    Not on file     Social Determinants of Health     Financial Resource Strain: Not on file   Food Insecurity: Not on file   Transportation Needs: Not on file   Physical Activity: Not on file   Stress: Not on file   Social Connections: Not on file   Intimate Partner Violence: Not on file   Housing Stability: Not on file         Objective:     Physical Exam:                                                                 Vitals:            Constitutional:    /78 (BP Location: Left arm, Patient Position: Sitting, Cuff Size: Adult)   Pulse 77   Temp 98.1 °F (36.7 °C) (Temporal)   Wt 69.8 kg (153 lb 14.4 oz)   LMP 10/16/2021 Comment: denies preg  SpO2 96%   BMI 29.08 kg/m²   BP Readings from Last 3 Encounters:   07/09/24 118/78   04/05/24 110/72   01/17/24 112/80      Pulse Readings from Last 3 Encounters:   07/09/24 77   04/05/24 85   01/17/24 73         Well developed, well nourished, well groomed. No dysmorphic features.       Psychiatric:  Normal behavior and appropriate affect        Neurological Examination:     Mental status/cognitive function:   Orientated to time, place and person. Recent and remote memory intact. Attention span and concentration as well as fund of knowledge are appropriate for age. Normal language and spontaneous speech.     Cranial Nerves:  II-visual fields full.   III, IV, VI-Pupils were equal, round, and reactive to light and accomodation. Extraocular movements were full and conjugate without nystagmus. Conjugate gaze, normal smooth pursuits, normal saccades   V-facial sensation symmetric.    VII-facial expression symmetric, intact forehead wrinkle, strong eye closure, symmetric smile    VIII-hearing grossly intact bilaterally   IX, X-palate elevation symmetric, no dysarthria.   XI-shoulder shrug strength intact    XII-tongue protrusion midline.    Motor Exam: symmetric bulk and tone throughout, no pronator drift. Power/strength 5/5 bilateral upper and lower extremities, no atrophy, fasciculations or abnormal movements noted.   Sensory: grossly intact light touch in all extremities.   Reflexes: brachioradialis 2+, biceps 2+, knee 2+ bilaterally  Coordination: Finger nose finger intact bilaterally, no apparent dysmetria, ataxia or tremor noted  Gait: steady casual and tandem gait.       Review of Systems:     Review of Systems   Constitutional:  Negative for appetite change, fatigue and fever.   HENT: Negative.  Negative for hearing loss, tinnitus, trouble swallowing and voice change.    Eyes: Negative.  Negative for photophobia, pain and visual disturbance.   Respiratory: Negative.  Negative for shortness of breath.    Cardiovascular: Negative.  Negative for palpitations.   Gastrointestinal: Negative.  Negative for nausea and vomiting.    Endocrine: Negative.  Negative for cold intolerance.   Genitourinary: Negative.  Negative for dysuria, frequency and urgency.   Musculoskeletal:  Negative for back pain, gait problem, myalgias, neck pain and neck stiffness.   Skin: Negative.  Negative for rash.   Allergic/Immunologic: Negative.    Neurological: Negative.  Negative for dizziness, tremors, seizures, syncope, facial asymmetry, speech difficulty, weakness, light-headedness, numbness and headaches.   Hematological: Negative.  Does not bruise/bleed easily.   Psychiatric/Behavioral: Negative.  Negative for confusion, hallucinations and sleep disturbance.    All other systems reviewed and are negative.    I personally reviewed the ROS entered by the MA    I spent 30 minutes in total time for this visit.    Author:  Salomón Cho PA-C 7/9/2024 10:02 AM

## 2024-07-09 NOTE — PATIENT INSTRUCTIONS
Patient Instructions:    Headache Calendar  Please maintain a headache calendar  Consider using phone applications such as Migraine Geraldo or Migraine Diary    Headache/migraine treatment:     Rescue medications (for immediate treatment of a headache):   It is ok to take ibuprofen, acetaminophen or naproxen (Advil, Tylenol,  Aleve, Excedrin) if they help your headaches you should limit these to No more than 3 times a week to avoid medication overuse/rebound headaches.     Prescription preventive medications for headaches/migraines   (to take every day to help prevent headaches - not to take at the time of headache):  - Start propranolol 20 mg, take 2 tablets by mouth once 30 to 60 minutes prior to sexual intercourse to treat primary headache associated with sexual activity.  Advised the patient to start she can try just taking 1 tablet by mouth 30 to 60 minutes before sexual intercourse.  She can try this for the first few times and see if there is any benefit or see if she would be comfortable with increasing the dosage afterwards.  Would advise the patient to keep a close eye on her heart rate and blood pressure while she is taking the propranolol and if she has any issues with the medication she can certainly let me know.    *Typically these types of medications take time until you see the benefit, although some may see improvement in days, often it may take weeks, especially if the medication is being titrated up to a beneficial level. Please contact us if there are any concerns or questions regarding the medication.     Over the counter preventive supplements for headaches/migraines (if you try, try for 3 months straight)  (to take every day to help prevent headaches - not to take at the time of headache):  There are combo pills online of these - none of which regulated by FDA and double check dosing - take appropriate dose only once a day- prevent a migraine, migravent, mind ease, migrelief   [] Magnesium 400mg  daily (If any diarrhea or upset stomach, decrease dose  as tolerated)  [] Riboflavin (Vitamin B2) 400mg daily (may make your urine bright/neon yellow)    - All supplements can be purchased online    Lifestyle Recommendations:  [x] SLEEP - Maintain a regular sleep schedule: Adults need at least 7-8 hours of uninterrupted a night. Maintain good sleep hygiene:  Going to bed and waking up at consistent times, avoiding excessive daytime naps, avoiding caffeinated beverages in the evening, avoid excessive stimulation in the evening and generally using bed primarily for sleeping.  One hour before bedtime would recommend turning lights down lower, decreasing your activity (may read quietly, listen to music at a low volume). When you get into bed, should eliminate all technology (no texting, emailing, playing with your phone, iPad or tablet in bed).  [x] HYDRATION - Maintain good hydration.  Drink  2L of fluid a day (4 typical small water bottles)  [x] DIET - Maintain good nutrition. In particular don't skip meals and try and eat healthy balanced meals regularly.  [x] TRIGGERS - Look for other triggers and avoid them: Limit caffeine to 1-2 cups a day or less. Avoid dietary triggers that you have noticed bring on your headaches (this could include aged cheese, peanuts, MSG, aspartame and nitrates).  [x] EXERCISE - physical exercise as we all know is good for you in many ways, and not only is good for your heart, but also is beneficial for your mental health, cognitive health and  chronic pain/headaches. I would encourage at the least 5 days of physical exercise weekly for at least 30 minutes.     Education and Follow-up  [x] Please call with any questions or concerns. Of course if any new concerning symptoms go to the emergency department.  [x] Follow up in 6 months with Russell LONDON

## 2024-07-11 ENCOUNTER — RA CDI HCC (OUTPATIENT)
Dept: OTHER | Facility: HOSPITAL | Age: 54
End: 2024-07-11

## 2024-07-11 NOTE — PROGRESS NOTES
HCC coding opportunities       Chart reviewed, no opportunity found: CHART REVIEWED, NO OPPORTUNITY FOUND        Patients Insurance        Commercial Insurance: [employer]

## 2024-07-18 ENCOUNTER — OFFICE VISIT (OUTPATIENT)
Dept: FAMILY MEDICINE CLINIC | Facility: CLINIC | Age: 54
End: 2024-07-18
Payer: COMMERCIAL

## 2024-07-18 VITALS
HEIGHT: 61 IN | WEIGHT: 151.6 LBS | DIASTOLIC BLOOD PRESSURE: 82 MMHG | SYSTOLIC BLOOD PRESSURE: 118 MMHG | OXYGEN SATURATION: 100 % | RESPIRATION RATE: 16 BRPM | BODY MASS INDEX: 28.62 KG/M2 | TEMPERATURE: 97.6 F | HEART RATE: 77 BPM

## 2024-07-18 DIAGNOSIS — G43.709 CHRONIC MIGRAINE WITHOUT AURA WITHOUT STATUS MIGRAINOSUS, NOT INTRACTABLE: ICD-10-CM

## 2024-07-18 DIAGNOSIS — E78.5 MILD HYPERLIPIDEMIA: Primary | ICD-10-CM

## 2024-07-18 DIAGNOSIS — N95.1 POST MENOPAUSAL SYNDROME: ICD-10-CM

## 2024-07-18 DIAGNOSIS — E55.9 VITAMIN D DEFICIENCY: ICD-10-CM

## 2024-07-18 DIAGNOSIS — Z78.0 ENCOUNTER FOR OSTEOPOROSIS SCREENING IN ASYMPTOMATIC POSTMENOPAUSAL PATIENT: ICD-10-CM

## 2024-07-18 DIAGNOSIS — Z23 ENCOUNTER FOR IMMUNIZATION: ICD-10-CM

## 2024-07-18 DIAGNOSIS — Z13.820 ENCOUNTER FOR OSTEOPOROSIS SCREENING IN ASYMPTOMATIC POSTMENOPAUSAL PATIENT: ICD-10-CM

## 2024-07-18 PROBLEM — R74.8 ELEVATED LIVER ENZYMES: Status: RESOLVED | Noted: 2022-03-01 | Resolved: 2024-07-18

## 2024-07-18 PROCEDURE — 90715 TDAP VACCINE 7 YRS/> IM: CPT

## 2024-07-18 PROCEDURE — 90471 IMMUNIZATION ADMIN: CPT

## 2024-07-18 PROCEDURE — 99214 OFFICE O/P EST MOD 30 MIN: CPT | Performed by: FAMILY MEDICINE

## 2024-07-18 RX ORDER — ROSUVASTATIN CALCIUM 10 MG/1
10 TABLET, COATED ORAL DAILY
Qty: 90 TABLET | Refills: 3 | Status: SHIPPED | OUTPATIENT
Start: 2024-07-18

## 2024-07-18 NOTE — PROGRESS NOTES
Ambulatory Visit  Name: Shilpa Saleh      : 1970      MRN: [id number]  Encounter Provider: Judith Nation DO  Encounter Date: 2024   Encounter department: [facility]    Assessment & Plan   1. Mild hyperlipidemia  Assessment & Plan:  Stable on crestor  Orders:  -     Comprehensive metabolic panel; Future; Expected date: 2025  -     CBC; Future; Expected date: 2025  -     Lipid Panel with Direct LDL reflex; Future; Expected date: 2025  -     TSH, 3rd generation with Free T4 reflex; Future; Expected date: 2025  -     rosuvastatin (CRESTOR) 10 MG tablet; Take 1 tablet (10 mg total) by mouth daily  2. Chronic migraine without aura without status migrainosus, not intractable  Assessment & Plan:  Seen by neurology  Taking propanolol for preventative  3. Vitamin D deficiency  Assessment & Plan:  Level stable  4. Post menopausal syndrome  Assessment & Plan:  On progesterone/estrogen  5. Encounter for osteoporosis screening in asymptomatic postmenopausal patient  -     DXA bone density spine hip and pelvis; Future; Expected date: 2024  6. Encounter for immunization  -     TDAP VACCINE GREATER THAN OR EQUAL TO 6YO IM      Depression Screening and Follow-up Plan: Patient was screened for depression during today's encounter. They screened negative with a PHQ-2 score of 0.      History of Present Illness     Here for follow up  Overall feeling good  No health issues  Going to gym again  Seen by neurology  Mri shows pineal cyst  Feeling some positive from hormone replacement    Hyperlipidemia  This is a chronic problem. The current episode started more than 1 year ago. The problem is controlled. Current antihyperlipidemic treatment includes statins. The current treatment provides significant improvement of lipids. There are no compliance problems.        Review of Systems   Constitutional: Negative.    HENT: Negative.     Eyes: Negative.    Respiratory: Negative.    "  Cardiovascular: Negative.    Gastrointestinal: Negative.    Endocrine: Negative.    Genitourinary: Negative.    Musculoskeletal: Negative.    Allergic/Immunologic: Negative.    Neurological:  Positive for headaches.   Hematological: Negative.    Psychiatric/Behavioral: Negative.       Medical History Reviewed by provider this encounter:       Objective     /82 (BP Location: Left arm, Patient Position: Sitting, Cuff Size: Standard)   Pulse 77   Temp 97.6 °F (36.4 °C) (Tympanic)   Resp 16   Ht 5' 1\" (1.549 m)   Wt 68.8 kg (151 lb 9.6 oz)   LMP 10/16/2021 Comment: denies preg  SpO2 100%   BMI 28.64 kg/m²     Physical Exam  Vitals and nursing note reviewed.   Constitutional:       Appearance: Normal appearance. She is well-developed.   HENT:      Head: Normocephalic and atraumatic.      Right Ear: External ear normal.      Left Ear: External ear normal.      Nose: Nose normal.   Eyes:      Conjunctiva/sclera: Conjunctivae normal.      Pupils: Pupils are equal, round, and reactive to light.   Cardiovascular:      Rate and Rhythm: Normal rate and regular rhythm.      Heart sounds: Normal heart sounds.   Pulmonary:      Effort: Pulmonary effort is normal.      Breath sounds: Normal breath sounds.   Abdominal:      General: Bowel sounds are normal.      Palpations: Abdomen is soft.   Musculoskeletal:         General: Normal range of motion.      Cervical back: Normal range of motion and neck supple.   Skin:     General: Skin is warm and dry.      Capillary Refill: Capillary refill takes less than 2 seconds.   Neurological:      Mental Status: She is alert and oriented to person, place, and time.   Psychiatric:         Behavior: Behavior normal.         Thought Content: Thought content normal.         Judgment: Judgment normal.       Administrative Statements     "

## 2024-10-01 ENCOUNTER — TELEPHONE (OUTPATIENT)
Age: 54
End: 2024-10-01

## 2024-10-01 NOTE — TELEPHONE ENCOUNTER
Pt called to sched a follow up with [facility] provider has no appts available. Informed pt I would forward her request to nurse/provider and they will call her back if they are able to offer her an appt.

## 2024-12-11 DIAGNOSIS — N95.1 MENOPAUSAL SYMPTOMS: ICD-10-CM

## 2024-12-12 RX ORDER — PROGESTERONE 100 MG/1
100 CAPSULE ORAL
Qty: 90 CAPSULE | Refills: 3 | Status: SHIPPED | OUTPATIENT
Start: 2024-12-12

## 2024-12-12 RX ORDER — ESTRADIOL 1 MG/1
1 TABLET ORAL DAILY
Qty: 90 TABLET | Refills: 3 | Status: SHIPPED | OUTPATIENT
Start: 2024-12-12

## 2025-01-13 ENCOUNTER — OFFICE VISIT (OUTPATIENT)
Dept: NEUROLOGY | Facility: CLINIC | Age: 55
End: 2025-01-13
Payer: COMMERCIAL

## 2025-01-13 VITALS
SYSTOLIC BLOOD PRESSURE: 122 MMHG | HEIGHT: 61 IN | WEIGHT: 148.8 LBS | TEMPERATURE: 98.1 F | OXYGEN SATURATION: 100 % | BODY MASS INDEX: 28.09 KG/M2 | DIASTOLIC BLOOD PRESSURE: 76 MMHG | HEART RATE: 78 BPM

## 2025-01-13 DIAGNOSIS — G44.82 PRIMARY HEADACHE ASSOCIATED WITH SEXUAL ACTIVITY: Primary | ICD-10-CM

## 2025-01-13 DIAGNOSIS — E34.8 PINEAL GLAND CYST: ICD-10-CM

## 2025-01-13 DIAGNOSIS — G44.219 EPISODIC TENSION-TYPE HEADACHE, NOT INTRACTABLE: ICD-10-CM

## 2025-01-13 PROCEDURE — 99213 OFFICE O/P EST LOW 20 MIN: CPT

## 2025-01-13 NOTE — PROGRESS NOTES
Review of Systems   Constitutional:  Negative for appetite change, fatigue and fever.   HENT: Negative.  Negative for hearing loss, tinnitus, trouble swallowing and voice change.    Eyes: Negative.  Negative for photophobia, pain and visual disturbance.   Respiratory: Negative.  Negative for shortness of breath.    Cardiovascular: Negative.  Negative for palpitations.   Gastrointestinal: Negative.  Negative for nausea and vomiting.   Endocrine: Negative.  Negative for cold intolerance.   Genitourinary: Negative.  Negative for dysuria, frequency and urgency.   Musculoskeletal:  Negative for back pain, gait problem, myalgias, neck pain and neck stiffness.   Skin: Negative.  Negative for rash.   Allergic/Immunologic: Negative.    Neurological:  Negative for dizziness, tremors, seizures, syncope, facial asymmetry, speech difficulty, weakness, light-headedness and numbness.   Hematological: Negative.  Does not bruise/bleed easily.   Psychiatric/Behavioral: Negative.  Negative for confusion, hallucinations and sleep disturbance.    All other systems reviewed and are negative.

## 2025-01-13 NOTE — ASSESSMENT & PLAN NOTE
I had the pleasure of seeing Shilpa today in the office at [facility] in [place].  She is presenting today for an office visit follow-up appointment in regard to her past history of primary headache associated with sexual activity and tension headaches.  At this time, patient states that her headaches are well under control at this time.  She notes that about 30 to 60 minutes prior to intercourse she will take 1 tablet of the propranolol 20 mg and she notes that this can usually prevent a headache from occurring with sexual intercourse.  She notes that sometimes she will not take the propranolol and she will receive a headache although the headaches now are not as intense as when she had them previously.  In regards the patient's tension headache she notes that they are not significantly bothersome either.  They occur a few times on the month and usually taking Excedrin Migraine for them when they do occur.  She has no other issues or concerns at this time.  The patient is going to continue using propranolol as needed for prophylaxis prior to sexual intercourse to treat her headaches.  We are also going to repeat an MRI brain with and without contrast for further evaluations of the patient's pineal gland cyst.  Patient should have this completed in May 2025.  After this, if findings are stable patient may not need any additional imaging moving forward.  Follow-up in about 6 months time with Russell MORRISON

## 2025-01-13 NOTE — PROGRESS NOTES
Name: Shilpa Saleh      : 1970      MRN: [id number]  Encounter Provider: Salomón Cho PA-C  Encounter Date: 2025   Encounter department: [facility]  :  Assessment & Plan  Primary headache associated with sexual activity  I had the pleasure of seeing Shilpa today in the office at [facility] in [place].  She is presenting today for an office visit follow-up appointment in regard to her past history of primary headache associated with sexual activity and tension headaches.  At this time, patient states that her headaches are well under control at this time.  She notes that about 30 to 60 minutes prior to intercourse she will take 1 tablet of the propranolol 20 mg and she notes that this can usually prevent a headache from occurring with sexual intercourse.  She notes that sometimes she will not take the propranolol and she will receive a headache although the headaches now are not as intense as when she had them previously.  In regards the patient's tension headache she notes that they are not significantly bothersome either.  They occur a few times on the month and usually taking Excedrin Migraine for them when they do occur.  She has no other issues or concerns at this time.  The patient is going to continue using propranolol as needed for prophylaxis prior to sexual intercourse to treat her headaches.  We are also going to repeat an MRI brain with and without contrast for further evaluations of the patient's pineal gland cyst.  Patient should have this completed in May 2025.  After this, if findings are stable patient may not need any additional imaging moving forward.  Follow-up in about 6 months time with Russell LONDON.         Episodic tension-type headache, not intractable         Pineal gland cyst    Orders:    MRI brain with and without contrast; Future      Patient Instructions   Additional Testing:   Neurodiagnostic workup: MRI brain with  and without contrast ordered for monitoring of pineal gland cyst.      Headache Calendar  Please maintain a headache calendar  Consider using phone applications such as Migraine Geraldo or Migraine Diary    Headache/migraine treatment:     Rescue medications (for immediate treatment of a headache):   It is ok to take ibuprofen, acetaminophen or naproxen (Advil, Tylenol,  Aleve, Excedrin) if they help your headaches you should limit these to No more than 3 times a week to avoid medication overuse/rebound headaches.     Prescription preventive medications for headaches/migraines   (to take every day to help prevent headaches - not to take at the time of headache):  - Would advise continuation of propranolol 20 mg, take 2 tablets by mouth once 30 to 60 minutes prior to sexual intercourse for prevention of primary headache associated with sexual activity.    *Typically these types of medications take time until you see the benefit, although some may see improvement in days, often it may take weeks, especially if the medication is being titrated up to a beneficial level. Please contact us if there are any concerns or questions regarding the medication.     Over the counter preventive supplements for headaches/migraines (if you try, try for 3 months straight)  (to take every day to help prevent headaches - not to take at the time of headache):  There are combo pills online of these - none of which regulated by FDA and double check dosing - take appropriate dose only once a day- prevent a migraine, migravent, mind ease, migrelief   [] Magnesium 400mg daily (If any diarrhea or upset stomach, decrease dose  as tolerated)  [] Riboflavin (Vitamin B2) 400mg daily (may make your urine bright/neon yellow)  - All supplements can be purchased online    Lifestyle Recommendations:  [x] SLEEP - Maintain a regular sleep schedule: Adults need at least 7-8 hours of uninterrupted a night. Maintain good sleep hygiene:  Going to bed and  waking up at consistent times, avoiding excessive daytime naps, avoiding caffeinated beverages in the evening, avoid excessive stimulation in the evening and generally using bed primarily for sleeping.  One hour before bedtime would recommend turning lights down lower, decreasing your activity (may read quietly, listen to music at a low volume). When you get into bed, should eliminate all technology (no texting, emailing, playing with your phone, iPad or tablet in bed).  [x] HYDRATION - Maintain good hydration.  Drink  2L of fluid a day (4 typical small water bottles)  [x] DIET - Maintain good nutrition. In particular don't skip meals and try and eat healthy balanced meals regularly.  [x] TRIGGERS - Look for other triggers and avoid them: Limit caffeine to 1-2 cups a day or less. Avoid dietary triggers that you have noticed bring on your headaches (this could include aged cheese, peanuts, MSG, aspartame and nitrates).  [x] EXERCISE - physical exercise as we all know is good for you in many ways, and not only is good for your heart, but also is beneficial for your mental health, cognitive health and  chronic pain/headaches. I would encourage at the least 5 days of physical exercise weekly for at least 30 minutes.     Education and Follow-up  [x] Please call with any questions or concerns. Of course if any new concerning symptoms go to the emergency department.  [x] Follow up in 6 months with Russell LONDON      History of Present Illness   HPI      For Review:    The patient was last seen in the office on 07/09/2024 by myself.  At that time patient was presenting for follow-up in regards to previously diagnosed primary headache associated with sexual activity.  Was noted that the patient had an MRI brain with and without contrast and MRA head with and without contrast which was significantly unrevealing for any acute intracranial abnormality contributing to the patient's headaches.  She has noted to have a 0.5 cm pineal  gland cyst on MRI brain with and without contrast. Is noted that based on this.  With the patient was having primary headache associated with sexual activity.  Patient stated that since her last appointment she was doing relatively well with her headaches.  She reported that her tension type headaches were relatively fine and she was only having a few days out of the month.  She was usually using Excedrin Migraine to handle tension headaches.  In regard to her headaches associated with sexual activity she had been recommended potential prophylactic treatment with propranolol or indomethacin.  We had decided at the last visit to proceed with the propranolol 20 mg, taking 2 tablets by mouth once 30 to 60 minutes prior to sexual intercourse.  Advised patient to start with 1 tablet for the first few times taking the medication and see how she does, can increase to 2 tablets moving forward if need be.  Advised patient to keep an eye on her heart rate using her Fitbit and making sure her resting heart rate was not going below 60 bpm.      Current medical illnesses:patellofemoral dysfunction, chronic left shoulder pain, postmenopausal syndrome, hyperlipidemia, vitamin D deficiency, insomnia         What medications do you take or have you taken for your headaches?   Current Preventive:   None     Current Abortive:   Excedrin migraine      Prior Preventive:   None     Prior Abortive:   Motrin, Tylenol, Maxalt (for migraines)       Interval updates as of 1/13/2025:    Has been doing well with propranolol for headache prevention. She has been taking the propranolol for preventative therapy before and when she does take medication seems to work for preventing the headaches.  If not taking the propranolol, sometimes she will experience headaches during sexual activity.  She notes that the headaches are not as intense as they were previously. Tension headaches occuring a few times a month, usually taking Excedrin migraine for  them which helps. Patient also interested in imaging to continue to monitor pineal gland cyst previously noted.     Review of Systems I have personally reviewed the MA's review of systems and made changes as necessary.    Medical History Reviewed by provider this encounter:  Tobacco  Allergies  Meds  Problems  Med Hx  Surg Hx  Fam Hx     .  Past Medical History   Past Medical History:   Diagnosis Date    Abnormal blood chemistry     Acute bilateral low back pain without sciatica 2020    Allergic     Seasonal    Chest pain     HX OF CHEST PAIN STRESS     GERD (gastroesophageal reflux disease)     Occasionally    H/O  section     hx of  delivery    Headache(784.0)     Hyperlipidemia     Migraine     Miscarriage     Rh incompatibility     Varicella 1979     Past Surgical History:   Procedure Laterality Date     SECTION  1997    CYSTECTOMY Right     Ovarian    INDUCED       Surgical Treatment Of Spontaneous     TOOTH EXTRACTION Bilateral     TUBAL LIGATION      Midline     Family History   Problem Relation Age of Onset    Coronary artery disease Mother     Hypothyroidism Mother     Hyperlipidemia Mother         Pure    Migraines Mother     Osteoporosis Mother     Thyroid disease Mother     Arthritis Mother     Cancer Father     Diabetes Father     Colon cancer Father 70    Dementia Father     Diabetes Maternal Grandmother     Diabetes Paternal Grandmother     Stroke Paternal Grandmother     No Known Problems Maternal Aunt     Heart attack Maternal Grandfather     No Known Problems Paternal Grandfather     No Known Problems Son     No Known Problems Son       reports that she has never smoked. She has never been exposed to tobacco smoke. She has never used smokeless tobacco. She reports that she does not currently use alcohol. She reports that she does not use drugs.  Current Outpatient Medications on File Prior to Visit   Medication Sig Dispense Refill     cholecalciferol (VITAMIN D3) 1,000 units tablet Take 1 tablet by mouth daily      estradiol (Estrace) 1 mg tablet Take 1 tablet (1 mg total) by mouth daily 90 tablet 3    Omega-3 Fatty Acids (FISH OIL PO) Take by mouth Taking 1250 mg      Progesterone 100 MG CAPS Take 100 mg by mouth at bedtime 90 capsule 3    propranolol (INDERAL) 20 mg tablet Take 2 tablets (40 mg) by mouth once 30-60 minutes prior to sexual intercourse to treat primary headache associated with sexual activity. 30 tablet 1    rosuvastatin (CRESTOR) 10 MG tablet Take 1 tablet (10 mg total) by mouth daily 90 tablet 3    estradiol (ESTRACE VAGINAL) 0.1 mg/g vaginal cream Insert 1 g into the vagina 2 (two) times a week (Patient not taking: Reported on 1/13/2025) 42.5 g PRN     No current facility-administered medications on file prior to visit.   No Known Allergies   Current Outpatient Medications on File Prior to Visit   Medication Sig Dispense Refill    cholecalciferol (VITAMIN D3) 1,000 units tablet Take 1 tablet by mouth daily      estradiol (Estrace) 1 mg tablet Take 1 tablet (1 mg total) by mouth daily 90 tablet 3    Omega-3 Fatty Acids (FISH OIL PO) Take by mouth Taking 1250 mg      Progesterone 100 MG CAPS Take 100 mg by mouth at bedtime 90 capsule 3    propranolol (INDERAL) 20 mg tablet Take 2 tablets (40 mg) by mouth once 30-60 minutes prior to sexual intercourse to treat primary headache associated with sexual activity. 30 tablet 1    rosuvastatin (CRESTOR) 10 MG tablet Take 1 tablet (10 mg total) by mouth daily 90 tablet 3    estradiol (ESTRACE VAGINAL) 0.1 mg/g vaginal cream Insert 1 g into the vagina 2 (two) times a week (Patient not taking: Reported on 1/13/2025) 42.5 g PRN     No current facility-administered medications on file prior to visit.      Social History     Tobacco Use    Smoking status: Never     Passive exposure: Never    Smokeless tobacco: Never   Vaping Use    Vaping status: Never Used   Substance and Sexual Activity  "   Alcohol use: Not Currently    Drug use: Never    Sexual activity: Yes     Partners: Male        Objective   /76 (BP Location: Left arm, Patient Position: Sitting, Cuff Size: Adult)   Pulse 78   Temp 98.1 °F (36.7 °C) (Temporal)   Ht 5' 1\" (1.549 m)   Wt 67.5 kg (148 lb 12.8 oz)   LMP 10/16/2021 Comment: denies preg  SpO2 100%   BMI 28.12 kg/m²     Physical Exam  Neurological Exam    Physical Exam:                                                                 Vitals:            Constitutional:    /76 (BP Location: Left arm, Patient Position: Sitting, Cuff Size: Adult)   Pulse 78   Temp 98.1 °F (36.7 °C) (Temporal)   Ht 5' 1\" (1.549 m)   Wt 67.5 kg (148 lb 12.8 oz)   LMP 10/16/2021 Comment: denies preg  SpO2 100%   BMI 28.12 kg/m²   BP Readings from Last 3 Encounters:   01/13/25 122/76   07/18/24 118/82   07/09/24 118/78     Pulse Readings from Last 3 Encounters:   01/13/25 78   07/18/24 77   07/09/24 77         Well developed, well nourished, well groomed. No dysmorphic features.       Psychiatric:  Normal behavior and appropriate affect        Neurological Examination:     Mental status/cognitive function:   Orientated to time, place and person. Recent and remote memory intact. Attention span and concentration as well as fund of knowledge are appropriate for age. Normal language and spontaneous speech.    Cranial Nerves:  II-visual fields full.   III, IV, VI-Pupils were equal, round, and reactive to light and accomodation. Extraocular movements were full and conjugate without nystagmus. Conjugate gaze, normal smooth pursuits, normal saccades   V-facial sensation symmetric.    VII-facial expression symmetric, intact forehead wrinkle, strong eye closure, symmetric smile    VIII-hearing grossly intact bilaterally   IX, X-palate elevation symmetric, no dysarthria.   XI-shoulder shrug strength intact    XII-tongue protrusion midline.    Motor Exam: symmetric bulk and tone throughout, no " pronator drift. Power/strength 5/5 bilateral upper and lower extremities, no atrophy, fasciculations or abnormal movements noted.   Sensory: grossly intact light touch in all extremities.   Reflexes: brachioradialis 2+, biceps 2+, knee 2+ bilaterally  Coordination: Finger nose finger intact bilaterally, no apparent dysmetria, ataxia or tremor noted  Gait: steady casual and tandem gait.      Administrative Statements   I have spent a total time of 20 minutes in caring for this patient on the day of the visit/encounter including Risks and benefits of tx options, Instructions for management, Patient and family education, Importance of tx compliance, Risk factor reductions, Impressions, Counseling / Coordination of care, Documenting in the medical record, Reviewing / ordering tests, medicine, procedures  , and Obtaining or reviewing history  .

## 2025-01-21 ENCOUNTER — OFFICE VISIT (OUTPATIENT)
Dept: FAMILY MEDICINE CLINIC | Facility: CLINIC | Age: 55
End: 2025-01-21
Payer: COMMERCIAL

## 2025-01-21 VITALS
HEIGHT: 62 IN | HEART RATE: 87 BPM | BODY MASS INDEX: 27.2 KG/M2 | SYSTOLIC BLOOD PRESSURE: 124 MMHG | TEMPERATURE: 97.3 F | DIASTOLIC BLOOD PRESSURE: 80 MMHG | WEIGHT: 147.8 LBS | RESPIRATION RATE: 16 BRPM | OXYGEN SATURATION: 98 %

## 2025-01-21 DIAGNOSIS — Z00.00 ANNUAL PHYSICAL EXAM: Primary | ICD-10-CM

## 2025-01-21 DIAGNOSIS — Z12.31 ENCOUNTER FOR SCREENING MAMMOGRAM FOR BREAST CANCER: ICD-10-CM

## 2025-01-21 DIAGNOSIS — F33.8 SEASONAL AFFECTIVE DISORDER (HCC): ICD-10-CM

## 2025-01-21 PROCEDURE — 99396 PREV VISIT EST AGE 40-64: CPT | Performed by: FAMILY MEDICINE

## 2025-01-21 RX ORDER — BUPROPION HYDROCHLORIDE 75 MG/1
75 TABLET ORAL 2 TIMES DAILY
Qty: 60 TABLET | Refills: 5 | Status: SHIPPED | OUTPATIENT
Start: 2025-01-21

## 2025-01-21 NOTE — ASSESSMENT & PLAN NOTE
Start wellbutrin   Orders:    buPROPion (WELLBUTRIN) 75 mg tablet; Take 1 tablet (75 mg total) by mouth 2 (two) times a day

## 2025-01-21 NOTE — ASSESSMENT & PLAN NOTE
BMI Counseling: Body mass index is 27.2 kg/m². The BMI is above normal. Exercise recommendations include exercising 3-5 times per week.

## 2025-01-21 NOTE — PATIENT INSTRUCTIONS
"Patient Education     Routine physical for adults   The Basics   Written by the doctors and editors at [facility]   What is a physical? -- A physical is a routine visit, or \"check-up,\" with your doctor. You might also hear it called a \"wellness visit\" or \"preventive visit.\"  During each visit, the doctor will:   Ask about your physical and mental health   Ask about your habits, behaviors, and lifestyle   Do an exam   Give you vaccines if needed   Talk to you about any medicines you take   Give advice about your health   Answer your questions  Getting regular check-ups is an important part of taking care of your health. It can help your doctor find and treat any problems you have. But it's also important for preventing health problems.  A routine physical is different from a \"sick visit.\" A sick visit is when you see a doctor because of a health concern or problem. Since physicals are scheduled ahead of time, you can think about what you want to ask the doctor.  How often should I get a physical? -- It depends on your age and health. In general, for people age 21 years and older:   If you are younger than 50 years, you might be able to get a physical every 3 years.   If you are 50 years or older, your doctor might recommend a physical every year.  If you have an ongoing health condition, like diabetes or high blood pressure, your doctor will probably want to see you more often.  What happens during a physical? -- In general, each visit will include:   Physical exam - The doctor or nurse will check your height, weight, heart rate, and blood pressure. They will also look at your eyes and ears. They will ask about how you are feeling and whether you have any symptoms that bother you.   Medicines - It's a good idea to bring a list of all the medicines you take to each doctor visit. Your doctor will talk to you about your medicines and answer any questions. Tell them if you are having any side effects that bother you. You " "should also tell them if you are having trouble paying for any of your medicines.   Habits and behaviors - This includes:   Your diet   Your exercise habits   Whether you smoke, drink alcohol, or use drugs   Whether you are sexually active   Whether you feel safe at home  Your doctor will talk to you about things you can do to improve your health and lower your risk of health problems. They will also offer help and support. For example, if you want to quit smoking, they can give you advice and might prescribe medicines. If you want to improve your diet or get more physical activity, they can help you with this, too.   Lab tests, if needed - The tests you get will depend on your age and situation. For example, your doctor might want to check your:   Cholesterol   Blood sugar   Iron level   Vaccines - The recommended vaccines will depend on your age, health, and what vaccines you already had. Vaccines are very important because they can prevent certain serious or deadly infections.   Discussion of screening - \"Screening\" means checking for diseases or other health problems before they cause symptoms. Your doctor can recommend screening based on your age, risk, and preferences. This might include tests to check for:   Cancer, such as breast, prostate, cervical, ovarian, colorectal, prostate, lung, or skin cancer   Sexually transmitted infections, such as chlamydia and gonorrhea   Mental health conditions like depression and anxiety  Your doctor will talk to you about the different types of screening tests. They can help you decide which screenings to have. They can also explain what the results might mean.   Answering questions - The physical is a good time to ask the doctor or nurse questions about your health. If needed, they can refer you to other doctors or specialists, too.  Adults older than 65 years often need other care, too. As you get older, your doctor will talk to you about:   How to prevent falling at " home   Hearing or vision tests   Memory testing   How to take your medicines safely   Making sure that you have the help and support you need at home  All topics are updated as new evidence becomes available and our peer review process is complete.  This topic retrieved from [employer] on: May 02, 2024.  Topic 410568 Version 1.0  Release: 32.4.3 - C32.122  © 2024 UpToDate, Inc. and/or its affiliates. All rights reserved.  Consumer Information Use and Disclaimer   Disclaimer: This generalized information is a limited summary of diagnosis, treatment, and/or medication information. It is not meant to be comprehensive and should be used as a tool to help the user understand and/or assess potential diagnostic and treatment options. It does NOT include all information about conditions, treatments, medications, side effects, or risks that may apply to a specific patient. It is not intended to be medical advice or a substitute for the medical advice, diagnosis, or treatment of a health care provider based on the health care provider's examination and assessment of a patient's specific and unique circumstances. Patients must speak with a health care provider for complete information about their health, medical questions, and treatment options, including any risks or benefits regarding use of medications. This information does not endorse any treatments or medications as safe, effective, or approved for treating a specific patient. UpToDate, Inc. and its affiliates disclaim any warranty or liability relating to this information or the use thereof.The use of this information is governed by the Terms of Use, available at https://www.[employer].com/en/know/clinical-effectiveness-terms. 2024© UpToDate, Inc. and its affiliates and/or licensors. All rights reserved.  Copyright   © 2024 UpToDate, Inc. and/or its affiliates. All rights reserved.

## 2025-01-21 NOTE — PROGRESS NOTES
Adult Annual Physical  Name: Shilpa Saleh      : 1970      MRN: [id number]  Encounter Provider: Judith Nation DO  Encounter Date: 2025   Encounter department: [facility]    Assessment & Plan  Annual physical exam  BMI Counseling: Body mass index is 27.2 kg/m². The BMI is above normal. Exercise recommendations include exercising 3-5 times per week.        Encounter for screening mammogram for breast cancer    Orders:    Mammo screening bilateral w 3d and cad; Future    Seasonal affective disorder (HCC)  Start wellbutrin   Orders:    buPROPion (WELLBUTRIN) 75 mg tablet; Take 1 tablet (75 mg total) by mouth 2 (two) times a day    Immunizations and preventive care screenings were discussed with patient today. Appropriate education was printed on patient's after visit summary.    Counseling:  Dental Health: discussed importance of regular tooth brushing, flossing, and dental visits.      Depression Screening and Follow-up Plan: Patient was screened for depression during today's encounter. They screened negative with a PHQ-2 score of 0.        History of Present Illness     Adult Annual Physical:  Patient presents for annual physical. Here for annual  Had cold last week but better  Will get labs next week  Lost 10 pounds but regained 5.     Diet and Physical Activity:  - Diet/Nutrition: well balanced diet.  - Exercise: no formal exercise. does better in summer    Depression Screening:  - PHQ-2 Score: 0    General Health:  - Sleep: sleeps well.  - Hearing: normal hearing right ear and normal hearing left ear.  - Vision: wears glasses.  - Dental: regular dental visits.    /GYN Health:    - Menopause: postmenopausal.     Review of Systems   Constitutional: Negative.    HENT: Negative.     Eyes: Negative.    Respiratory: Negative.     Cardiovascular: Negative.    Gastrointestinal: Negative.    Endocrine: Negative.    Genitourinary: Negative.    Musculoskeletal: Negative.   "  Allergic/Immunologic: Negative.    Neurological: Negative.    Hematological: Negative.    Psychiatric/Behavioral:  Positive for dysphoric mood (seasonal depression).      Medical History Reviewed by provider this encounter:  Tobacco  Allergies  Meds  Problems  Med Hx  Surg Hx  Fam Hx     .    Objective   /80 (BP Location: Left arm, Patient Position: Sitting, Cuff Size: Standard)   Pulse 87   Temp (!) 97.3 °F (36.3 °C) (Tympanic)   Resp 16   Ht 5' 1.81\" (1.57 m)   Wt 67 kg (147 lb 12.8 oz)   LMP 10/16/2021 Comment: denies preg  SpO2 98%   BMI 27.20 kg/m²     Physical Exam  Vitals and nursing note reviewed.   Constitutional:       Appearance: Normal appearance. She is well-developed.   HENT:      Head: Normocephalic and atraumatic.      Right Ear: External ear normal.      Left Ear: External ear normal.      Nose: Nose normal.   Eyes:      Extraocular Movements: Extraocular movements intact.      Conjunctiva/sclera: Conjunctivae normal.      Pupils: Pupils are equal, round, and reactive to light.   Cardiovascular:      Rate and Rhythm: Normal rate and regular rhythm.      Heart sounds: Normal heart sounds.   Pulmonary:      Effort: Pulmonary effort is normal.      Breath sounds: Normal breath sounds.   Abdominal:      General: Bowel sounds are normal.      Palpations: Abdomen is soft.   Musculoskeletal:         General: Normal range of motion.      Cervical back: Normal range of motion and neck supple.   Skin:     General: Skin is warm and dry.      Capillary Refill: Capillary refill takes less than 2 seconds.   Neurological:      Mental Status: She is alert and oriented to person, place, and time.   Psychiatric:         Behavior: Behavior normal.         Thought Content: Thought content normal.         Judgment: Judgment normal.         "

## 2025-01-31 ENCOUNTER — RESULTS FOLLOW-UP (OUTPATIENT)
Dept: FAMILY MEDICINE CLINIC | Facility: CLINIC | Age: 55
End: 2025-01-31

## 2025-01-31 ENCOUNTER — APPOINTMENT (OUTPATIENT)
Dept: LAB | Facility: CLINIC | Age: 55
End: 2025-01-31
Payer: COMMERCIAL

## 2025-01-31 DIAGNOSIS — E78.5 MILD HYPERLIPIDEMIA: ICD-10-CM

## 2025-01-31 LAB
ALBUMIN SERPL BCG-MCNC: 4.3 G/DL (ref 3.5–5)
ALP SERPL-CCNC: 76 U/L (ref 34–104)
ALT SERPL W P-5'-P-CCNC: 30 U/L (ref 7–52)
ANION GAP SERPL CALCULATED.3IONS-SCNC: 7 MMOL/L (ref 4–13)
AST SERPL W P-5'-P-CCNC: 20 U/L (ref 13–39)
BILIRUB SERPL-MCNC: 0.46 MG/DL (ref 0.2–1)
BUN SERPL-MCNC: 16 MG/DL (ref 5–25)
CALCIUM SERPL-MCNC: 9.1 MG/DL (ref 8.4–10.2)
CHLORIDE SERPL-SCNC: 104 MMOL/L (ref 96–108)
CHOLEST SERPL-MCNC: 224 MG/DL (ref ?–200)
CO2 SERPL-SCNC: 29 MMOL/L (ref 21–32)
CREAT SERPL-MCNC: 0.98 MG/DL (ref 0.6–1.3)
ERYTHROCYTE [DISTWIDTH] IN BLOOD BY AUTOMATED COUNT: 12 % (ref 11.6–15.1)
GFR SERPL CREATININE-BSD FRML MDRD: 65 ML/MIN/1.73SQ M
GLUCOSE P FAST SERPL-MCNC: 90 MG/DL (ref 65–99)
HCT VFR BLD AUTO: 40.2 % (ref 34.8–46.1)
HDLC SERPL-MCNC: 88 MG/DL
HGB BLD-MCNC: 13.3 G/DL (ref 11.5–15.4)
LDLC SERPL CALC-MCNC: 114 MG/DL (ref 0–100)
MCH RBC QN AUTO: 28.6 PG (ref 26.8–34.3)
MCHC RBC AUTO-ENTMCNC: 33.1 G/DL (ref 31.4–37.4)
MCV RBC AUTO: 87 FL (ref 82–98)
PLATELET # BLD AUTO: 279 THOUSANDS/UL (ref 149–390)
PMV BLD AUTO: 9.4 FL (ref 8.9–12.7)
POTASSIUM SERPL-SCNC: 4.1 MMOL/L (ref 3.5–5.3)
PROT SERPL-MCNC: 7.2 G/DL (ref 6.4–8.4)
RBC # BLD AUTO: 4.65 MILLION/UL (ref 3.81–5.12)
SODIUM SERPL-SCNC: 140 MMOL/L (ref 135–147)
TRIGL SERPL-MCNC: 109 MG/DL (ref ?–150)
TSH SERPL DL<=0.05 MIU/L-ACNC: 2.5 UIU/ML (ref 0.45–4.5)
WBC # BLD AUTO: 5.14 THOUSAND/UL (ref 4.31–10.16)

## 2025-01-31 PROCEDURE — 36415 COLL VENOUS BLD VENIPUNCTURE: CPT

## 2025-01-31 PROCEDURE — 85027 COMPLETE CBC AUTOMATED: CPT

## 2025-01-31 PROCEDURE — 80053 COMPREHEN METABOLIC PANEL: CPT

## 2025-01-31 PROCEDURE — 84443 ASSAY THYROID STIM HORMONE: CPT

## 2025-01-31 PROCEDURE — 80061 LIPID PANEL: CPT

## 2025-03-13 ENCOUNTER — OFFICE VISIT (OUTPATIENT)
Age: 55
End: 2025-03-13
Payer: COMMERCIAL

## 2025-03-13 DIAGNOSIS — N95.1 MENOPAUSAL SYMPTOMS: Primary | ICD-10-CM

## 2025-03-13 DIAGNOSIS — N95.2 VAGINAL ATROPHY: ICD-10-CM

## 2025-03-13 PROBLEM — G89.29 CHRONIC LEFT SHOULDER PAIN: Status: RESOLVED | Noted: 2023-08-28 | Resolved: 2025-03-13

## 2025-03-13 PROBLEM — M25.512 CHRONIC LEFT SHOULDER PAIN: Status: RESOLVED | Noted: 2023-08-28 | Resolved: 2025-03-13

## 2025-03-13 PROBLEM — M75.02 ADHESIVE CAPSULITIS OF LEFT SHOULDER: Status: RESOLVED | Noted: 2023-08-31 | Resolved: 2025-03-13

## 2025-03-13 PROBLEM — Z00.00 ANNUAL PHYSICAL EXAM: Status: RESOLVED | Noted: 2024-01-17 | Resolved: 2025-03-13

## 2025-03-13 PROCEDURE — 99213 OFFICE O/P EST LOW 20 MIN: CPT | Performed by: OBSTETRICS & GYNECOLOGY

## 2025-03-13 RX ORDER — ESTRADIOL 10 UG/1
1 TABLET, FILM COATED VAGINAL 2 TIMES WEEKLY
Qty: 24 TABLET | Refills: 3 | Status: SHIPPED | OUTPATIENT
Start: 2025-03-13

## 2025-03-13 RX ORDER — ESTRADIOL AND PROGESTERONE 1; 100 MG/1; MG/1
1 CAPSULE ORAL
Qty: 90 CAPSULE | Refills: 4 | Status: SHIPPED | OUTPATIENT
Start: 2025-03-13

## 2025-03-17 NOTE — PROGRESS NOTES
:  Assessment & Plan  Menopausal symptoms    Orders:    Estradiol-Progesterone (Bijuva) 1-100 MG CAPS; Take 1 mg by mouth daily at bedtime    Vaginal atrophy    Orders:    estradiol (VAGIFEM, YUVAFEM) 10 MCG TABS vaginal tablet; Insert 1 tablet (10 mcg total) into the vagina 2 (two) times a week      1) Overall, the benefits of HRT still outweigh the risks, so now changes in doses issued. I did trade this for the new combination pill Bijuva, her insurance covers this well. NO concern with thunderclap headache, well controlled.   2) Trade the vaginal cream for a suppository to minimize the discharge.   3) Follow up one year or prn    This was a 20 minute visit with greater than 50% of time spent in face to face counseling and coordination of care    Shilpa returns for hormone consult follow up. I saw her last in 11/23 with menopausal symptoms, vaginal dryness.   Estradiol 1 mg/ progesterone 100 mg with estradiol cream prescribed. Since then:  1)Retired. Still has seasonal depression, now treated with Wellbutrin 75 mg.  2) Vagina cream sometimes leaves a brown discharge the next day.   3) Overall feels well, sleep is good.   4) Has new onset of postcoital migraine, treated well with propanolol  NO other health effects, Father passed away from dementia 88    Review of Systems   Constitutional: Negative.    Gastrointestinal: Negative.    Endocrine: Negative.    Genitourinary:  Positive for vaginal discharge.   Neurological:  Positive for headaches.   Psychiatric/Behavioral: Negative.          Physical Exam

## 2025-04-02 ENCOUNTER — HOSPITAL ENCOUNTER (OUTPATIENT)
Dept: RADIOLOGY | Age: 55
Discharge: HOME | End: 2025-04-02
Payer: COMMERCIAL

## 2025-04-02 VITALS — BODY MASS INDEX: 27.75 KG/M2 | HEIGHT: 61 IN | WEIGHT: 147 LBS

## 2025-04-02 DIAGNOSIS — Z13.820 ENCOUNTER FOR OSTEOPOROSIS SCREENING IN ASYMPTOMATIC POSTMENOPAUSAL PATIENT: ICD-10-CM

## 2025-04-02 DIAGNOSIS — Z78.0 ENCOUNTER FOR OSTEOPOROSIS SCREENING IN ASYMPTOMATIC POSTMENOPAUSAL PATIENT: ICD-10-CM

## 2025-04-02 PROCEDURE — 77080 DXA BONE DENSITY AXIAL: CPT

## 2025-04-07 ENCOUNTER — OFFICE VISIT (OUTPATIENT)
Dept: FAMILY MEDICINE CLINIC | Facility: CLINIC | Age: 55
End: 2025-04-07
Payer: COMMERCIAL

## 2025-04-07 VITALS
RESPIRATION RATE: 16 BRPM | HEIGHT: 61 IN | HEART RATE: 86 BPM | TEMPERATURE: 96.8 F | WEIGHT: 146.8 LBS | SYSTOLIC BLOOD PRESSURE: 114 MMHG | OXYGEN SATURATION: 99 % | BODY MASS INDEX: 27.72 KG/M2 | DIASTOLIC BLOOD PRESSURE: 80 MMHG

## 2025-04-07 DIAGNOSIS — M81.0 AGE-RELATED OSTEOPOROSIS WITHOUT CURRENT PATHOLOGICAL FRACTURE: Primary | ICD-10-CM

## 2025-04-07 PROCEDURE — 99213 OFFICE O/P EST LOW 20 MIN: CPT | Performed by: FAMILY MEDICINE

## 2025-04-07 NOTE — PROGRESS NOTES
"Name: Shilpa Saleh      : 1970      MRN: [id number]  Encounter Provider: Judith Nation DO  Encounter Date: 2025   Encounter department: [facility]    :  Assessment & Plan  Age-related osteoporosis without current pathological fracture  Calcium,vitamin d and exercise  Will think about treatment options  Will get back to me             Assessment & Plan         History of Present Illness     History of Present Illness  Family history of osteoporosis  Mother and paternal grandmother     Review of Systems   Constitutional: Negative.    HENT: Negative.     Eyes: Negative.    Respiratory: Negative.     Cardiovascular: Negative.    Gastrointestinal: Negative.    Endocrine: Negative.    Genitourinary: Negative.    Musculoskeletal: Negative.    Allergic/Immunologic: Negative.    Neurological: Negative.    Hematological: Negative.    Psychiatric/Behavioral: Negative.       Objective   /80 (BP Location: Left arm, Patient Position: Sitting, Cuff Size: Standard)   Pulse 86   Temp (!) 96.8 °F (36 °C) (Tympanic)   Resp 16   Ht 5' 1\" (1.549 m)   Wt 66.6 kg (146 lb 12.8 oz)   LMP 10/16/2021 Comment: denies preg  SpO2 99%   BMI 27.74 kg/m²     Physical Exam    Physical Exam  Vitals and nursing note reviewed.   Constitutional:       Appearance: Normal appearance. She is well-developed.   HENT:      Head: Normocephalic and atraumatic.      Right Ear: External ear normal.      Left Ear: External ear normal.      Nose: Nose normal.   Eyes:      Extraocular Movements: Extraocular movements intact.      Conjunctiva/sclera: Conjunctivae normal.      Pupils: Pupils are equal, round, and reactive to light.   Cardiovascular:      Rate and Rhythm: Normal rate and regular rhythm.      Heart sounds: Normal heart sounds.   Pulmonary:      Effort: Pulmonary effort is normal.      Breath sounds: Normal breath sounds.   Abdominal:      General: Abdomen is flat. Bowel sounds are normal.      Palpations: " Abdomen is soft.   Musculoskeletal:         General: Normal range of motion.      Cervical back: Normal range of motion and neck supple.   Skin:     General: Skin is warm and dry.      Capillary Refill: Capillary refill takes less than 2 seconds.   Neurological:      General: No focal deficit present.      Mental Status: She is alert and oriented to person, place, and time.   Psychiatric:         Mood and Affect: Mood normal.         Behavior: Behavior normal.         Thought Content: Thought content normal.         Judgment: Judgment normal.

## 2025-05-01 ENCOUNTER — HOSPITAL ENCOUNTER (OUTPATIENT)
Dept: RADIOLOGY | Age: 55
Discharge: HOME | End: 2025-05-01
Payer: COMMERCIAL

## 2025-05-01 DIAGNOSIS — Z12.31 ENCOUNTER FOR SCREENING MAMMOGRAM FOR BREAST CANCER: ICD-10-CM

## 2025-05-01 PROCEDURE — 77063 BREAST TOMOSYNTHESIS BI: CPT

## 2025-05-01 PROCEDURE — 77067 SCR MAMMO BI INCL CAD: CPT

## 2025-07-22 ENCOUNTER — OFFICE VISIT (OUTPATIENT)
Dept: FAMILY MEDICINE CLINIC | Facility: CLINIC | Age: 55
End: 2025-07-22
Payer: COMMERCIAL

## 2025-07-22 VITALS
HEIGHT: 61 IN | HEART RATE: 85 BPM | WEIGHT: 144.4 LBS | TEMPERATURE: 97.4 F | RESPIRATION RATE: 16 BRPM | OXYGEN SATURATION: 100 % | SYSTOLIC BLOOD PRESSURE: 110 MMHG | DIASTOLIC BLOOD PRESSURE: 82 MMHG | BODY MASS INDEX: 27.26 KG/M2

## 2025-07-22 DIAGNOSIS — E78.5 MILD HYPERLIPIDEMIA: Primary | ICD-10-CM

## 2025-07-22 DIAGNOSIS — E55.9 VITAMIN D DEFICIENCY: ICD-10-CM

## 2025-07-22 DIAGNOSIS — M79.662 PAIN OF LEFT CALF: ICD-10-CM

## 2025-07-22 DIAGNOSIS — M81.0 AGE-RELATED OSTEOPOROSIS WITHOUT CURRENT PATHOLOGICAL FRACTURE: ICD-10-CM

## 2025-07-22 DIAGNOSIS — F33.8 SEASONAL AFFECTIVE DISORDER (HCC): ICD-10-CM

## 2025-07-22 PROCEDURE — 99214 OFFICE O/P EST MOD 30 MIN: CPT | Performed by: FAMILY MEDICINE

## 2025-07-22 RX ORDER — ROSUVASTATIN CALCIUM 10 MG/1
10 TABLET, COATED ORAL DAILY
Qty: 90 TABLET | Refills: 3 | Status: SHIPPED | OUTPATIENT
Start: 2025-07-22

## 2025-07-22 NOTE — PROGRESS NOTES
"Name: Shilpa Saleh      : 1970      MRN: [id number]  Encounter Provider: Judith Nation DO  Encounter Date: 2025   Encounter department: [facility]    :  Assessment & Plan  Mild hyperlipidemia  Check lipid  Stable on crestor  Orders:  •  rosuvastatin (CRESTOR) 10 MG tablet; Take 1 tablet (10 mg total) by mouth daily  •  CBC; Future  •  Comprehensive metabolic panel; Future  •  Lipid Panel with Direct LDL reflex; Future  •  TSH, 3rd generation with Free T4 reflex; Future    Seasonal affective disorder (HCC)  Stable on wellbutrin         Vitamin D deficiency    Orders:  •  Vitamin D 25 hydroxy; Future    Age-related osteoporosis without current pathological fracture  Taking calcium and vitamin D  Orders:  •  Vitamin D 25 hydroxy; Future    Pain of left calf  In popliteal area  Only when sitting with knee bend  Will monitor for now         Assessment & Plan             History of Present Illness     History of Present Illness  Here for follow up  Overall feeling good  Getting a weird pain in leg in back of left leg when bends knee sitting on sofa  Has been exercising 4 days a week       Review of Systems  Objective   /82 (BP Location: Left arm, Patient Position: Sitting, Cuff Size: Standard)   Pulse 85   Temp (!) 97.4 °F (36.3 °C) (Tympanic)   Resp 16   Ht 5' 1\" (1.549 m)   Wt 65.5 kg (144 lb 6.4 oz)   LMP 10/16/2021 Comment: denies preg  SpO2 100%   BMI 27.28 kg/m²     Physical Exam    Physical Exam  Vitals and nursing note reviewed.   Constitutional:       Appearance: Normal appearance. She is well-developed.   HENT:      Head: Normocephalic and atraumatic.      Right Ear: External ear normal.      Left Ear: External ear normal.      Nose: Nose normal.     Eyes:      Extraocular Movements: Extraocular movements intact.      Conjunctiva/sclera: Conjunctivae normal.      Pupils: Pupils are equal, round, and reactive to light.       Cardiovascular:      Rate and Rhythm: " Normal rate and regular rhythm.      Heart sounds: Normal heart sounds.   Pulmonary:      Effort: Pulmonary effort is normal.      Breath sounds: Normal breath sounds.   Abdominal:      General: Bowel sounds are normal.      Palpations: Abdomen is soft.     Musculoskeletal:         General: Normal range of motion.      Cervical back: Normal range of motion and neck supple.     Skin:     General: Skin is warm and dry.      Capillary Refill: Capillary refill takes less than 2 seconds.     Neurological:      General: No focal deficit present.      Mental Status: She is alert and oriented to person, place, and time.     Psychiatric:         Mood and Affect: Mood normal.         Behavior: Behavior normal.         Thought Content: Thought content normal.         Judgment: Judgment normal.